# Patient Record
Sex: MALE | Race: WHITE | HISPANIC OR LATINO | Employment: UNEMPLOYED | URBAN - METROPOLITAN AREA
[De-identification: names, ages, dates, MRNs, and addresses within clinical notes are randomized per-mention and may not be internally consistent; named-entity substitution may affect disease eponyms.]

---

## 2017-01-11 ENCOUNTER — HOSPITAL ENCOUNTER (EMERGENCY)
Facility: HOSPITAL | Age: 22
Discharge: HOME | End: 2017-01-11
Attending: EMERGENCY MEDICINE | Admitting: EMERGENCY MEDICINE

## 2017-01-11 VITALS
HEART RATE: 74 BPM | DIASTOLIC BLOOD PRESSURE: 67 MMHG | RESPIRATION RATE: 20 BRPM | TEMPERATURE: 98.2 F | BODY MASS INDEX: 26.5 KG/M2 | WEIGHT: 190 LBS | SYSTOLIC BLOOD PRESSURE: 131 MMHG | OXYGEN SATURATION: 98 %

## 2017-01-11 DIAGNOSIS — M54.9 BACK PAIN: Primary | ICD-10-CM

## 2017-01-11 PROCEDURE — 99283 EMERGENCY DEPT VISIT LOW MDM: CPT

## 2017-01-11 RX ORDER — CYCLOBENZAPRINE HCL 10 MG
10 TABLET ORAL 2 TIMES DAILY PRN
Qty: 14 TABLET | Refills: 0 | Status: SHIPPED | OUTPATIENT
Start: 2017-01-11 | End: 2018-02-11

## 2017-01-11 RX ORDER — IBUPROFEN 400 MG/1
800 TABLET ORAL ONCE
Status: COMPLETED | OUTPATIENT
Start: 2017-01-11 | End: 2017-01-11

## 2017-01-11 RX ORDER — CYCLOBENZAPRINE HCL 10 MG
10 TABLET ORAL ONCE
Status: COMPLETED | OUTPATIENT
Start: 2017-01-11 | End: 2017-01-11

## 2017-01-11 RX ADMIN — CYCLOBENZAPRINE HYDROCHLORIDE 10 MG: 10 TABLET, FILM COATED ORAL at 23:06

## 2017-01-11 RX ADMIN — IBUPROFEN 800 MG: 400 TABLET ORAL at 23:05

## 2017-02-21 ENCOUNTER — APPOINTMENT (EMERGENCY)
Dept: RADIOLOGY | Facility: HOSPITAL | Age: 22
End: 2017-02-21
Payer: SUBSIDIZED

## 2017-02-21 ENCOUNTER — HOSPITAL ENCOUNTER (EMERGENCY)
Facility: HOSPITAL | Age: 22
Discharge: HOME | End: 2017-02-21
Attending: EMERGENCY MEDICINE | Admitting: EMERGENCY MEDICINE
Payer: SUBSIDIZED

## 2017-02-21 VITALS
WEIGHT: 195 LBS | TEMPERATURE: 97.5 F | SYSTOLIC BLOOD PRESSURE: 114 MMHG | DIASTOLIC BLOOD PRESSURE: 56 MMHG | BODY MASS INDEX: 27.3 KG/M2 | RESPIRATION RATE: 20 BRPM | OXYGEN SATURATION: 98 % | HEART RATE: 67 BPM | HEIGHT: 71 IN

## 2017-02-21 DIAGNOSIS — S83.90XA KNEE SPRAIN: Primary | ICD-10-CM

## 2017-02-21 PROCEDURE — 99283 EMERGENCY DEPT VISIT LOW MDM: CPT

## 2017-02-21 PROCEDURE — 73564 X-RAY EXAM KNEE 4 OR MORE: CPT

## 2017-02-21 RX ORDER — IBUPROFEN 600 MG/1
600 TABLET ORAL ONCE
Status: COMPLETED | OUTPATIENT
Start: 2017-02-21 | End: 2017-02-21

## 2017-02-21 RX ORDER — NAPROXEN 500 MG/1
500 TABLET ORAL 2 TIMES DAILY WITH MEALS
Qty: 10 TABLET | Refills: 0 | Status: SHIPPED | OUTPATIENT
Start: 2017-02-21 | End: 2018-02-11

## 2017-02-21 RX ORDER — HYDROCODONE BITARTRATE AND ACETAMINOPHEN 5; 325 MG/1; MG/1
1 TABLET ORAL EVERY 8 HOURS PRN
Qty: 10 TABLET | Refills: 0 | Status: SHIPPED | OUTPATIENT
Start: 2017-02-21 | End: 2017-02-24

## 2017-02-21 RX ADMIN — IBUPROFEN 600 MG: 600 TABLET, FILM COATED ORAL at 15:30

## 2018-02-11 ENCOUNTER — HOSPITAL ENCOUNTER (EMERGENCY)
Facility: HOSPITAL | Age: 23
Discharge: HOME | End: 2018-02-11
Attending: EMERGENCY MEDICINE | Admitting: EMERGENCY MEDICINE
Payer: COMMERCIAL

## 2018-02-11 ENCOUNTER — APPOINTMENT (EMERGENCY)
Dept: RADIOLOGY | Facility: HOSPITAL | Age: 23
End: 2018-02-11
Payer: COMMERCIAL

## 2018-02-11 VITALS
WEIGHT: 190 LBS | SYSTOLIC BLOOD PRESSURE: 140 MMHG | BODY MASS INDEX: 26.5 KG/M2 | RESPIRATION RATE: 16 BRPM | DIASTOLIC BLOOD PRESSURE: 66 MMHG | TEMPERATURE: 97.6 F | OXYGEN SATURATION: 98 % | HEART RATE: 89 BPM

## 2018-02-11 DIAGNOSIS — S83.90XA KNEE SPRAIN: Primary | ICD-10-CM

## 2018-02-11 PROCEDURE — 99283 EMERGENCY DEPT VISIT LOW MDM: CPT

## 2018-02-11 PROCEDURE — 73564 X-RAY EXAM KNEE 4 OR MORE: CPT

## 2018-02-11 RX ORDER — IBUPROFEN 600 MG/1
600 TABLET ORAL ONCE
Status: COMPLETED | OUTPATIENT
Start: 2018-02-11 | End: 2018-02-11

## 2018-02-11 RX ADMIN — IBUPROFEN 600 MG: 600 TABLET, FILM COATED ORAL at 20:17

## 2018-02-12 NOTE — DISCHARGE INSTRUCTIONS
Knee Sprain   WHAT YOU NEED TO KNOW:   A knee sprain occurs when one or more ligaments in your knee are suddenly stretched or torn  Ligaments are tissues that hold bones together  Ligaments support the knee and keep the joint and bones in the correct position  DISCHARGE INSTRUCTIONS:   Seek care immediately if:   · Any part of your leg feels cold, numb, or looks pale     Contact your healthcare provider if:   · You have new or increased swelling, bruising, or pain in your knee  · Your symptoms do not improve within 6 weeks, even with treatment  · You have questions or concerns about your condition or care  Medicines:   · NSAIDs , such as ibuprofen, help decrease swelling, pain, and fever  This medicine is available with or without a doctor's order  NSAIDs can cause stomach bleeding or kidney problems in certain people  If you take blood thinner medicine, always ask your healthcare provider if NSAIDs are safe for you  Always read the medicine label and follow directions  · Acetaminophen  decreases pain and fever  It is available without a doctor's order  Ask how much to take and how often to take it  Follow directions  Read the labels of all other medicines you are using to see if they also contain acetaminophen, or ask your doctor or pharmacist  Acetaminophen can cause liver damage if not taken correctly  Do not use more than 4 grams (4,000 milligrams) total of acetaminophen in one day  · Prescription pain medicine  may be given  Ask how to take this medicine safely  · Take your medicine as directed  Contact your healthcare provider if you think your medicine is not helping or if you have side effects  Tell him or her if you are allergic to any medicine  Keep a list of the medicines, vitamins, and herbs you take  Include the amounts, and when and why you take them  Bring the list or the pill bottles to follow-up visits  Carry your medicine list with you in case of an emergency    Self-care: · Rest  your knee and do not exercise  You may be told to keep weight off your knee  This means that you should not walk on your injured leg  Rest helps decrease swelling and allows the injury to heal  You can do gentle range of motion (ROM) exercises as directed  This will prevent stiffness  · Apply ice  on your knee for 15 to 20 minutes every hour or as directed  Use an ice pack, or put crushed ice in a plastic bag  Cover it with a towel  Ice helps prevent tissue damage and decreases swelling and pain  · Apply compression to your knee as directed  You may need to wear an elastic bandage  This helps keep your injured knee from moving too much while it heals  You can loosen or tighten the elastic bandage to make it comfortable  It should be tight enough for you to feel support  It should not be so tight that it causes your toes to feel numb or tingly  If you are wearing an elastic bandage, take it off and rewrap it once a day  · Elevate your knee  above the level of your heart as often as you can  This will help decrease swelling and pain  Prop your leg on pillows or blankets to keep it elevated comfortably  Do not put pillows directly behind your knee  · Use support devices as directed:  Support devices such as a splint or brace may be needed  These devices limit movement and protect your joint while it heals  You may be given crutches to use until you can stand on your injured leg without pain  Use devices as directed  Physical therapy:  A physical therapist teaches you exercises to help improve movement and strength, and to decrease pain  Prevent another knee sprain:  Exercise your legs to keep your muscles strong  Strong leg muscles help protect your knee and prevent strain  The following may also prevent a knee sprain:  · Slowly start your exercise or training program   Slowly increase the time, distance, and intensity of your exercise   Sudden increases in training may cause you to injure your knee again  · Wear protective braces and equipment as directed  Braces may prevent your knee from moving the wrong way and causing another sprain  Protective equipment may support your bones and ligaments to prevent injury  · Warm up and stretch before exercise  Warm up by walking or using an exercise bike before starting your regular exercise  Do gentle stretches after warming up  This helps to loosen your muscles and decrease stress on your knee  Cool down and stretch after you exercise  · Wear shoes that fit correctly and support your feet  Replace your running or exercise shoes before the padding or shock absorption is worn out  Ask your healthcare provider which exercise shoes are best for you  Ask if you should wear special shoe inserts  Shoe inserts can help support your heels and arches or keep your foot lined up correctly in your shoes  Exercise on flat surfaces  Follow up with your healthcare provider as directed:  Write down your questions so you remember to ask them during your visits  © 2017 [address] [employer] Information is for End User's use only and may not be sold, redistributed or otherwise used for commercial purposes  All illustrations and images included in CareNotes® are the copyrighted property of [employer]  or Tim Almazan  The above information is an  only  It is not intended as medical advice for individual conditions or treatments  Talk to your doctor, nurse or pharmacist before following any medical regimen to see if it is safe and effective for you

## 2018-02-12 NOTE — ED PROVIDER NOTES
History  Chief Complaint   Patient presents with    Knee Pain     R knee injury three weeks ago at work, today slipped and injured same knee     Patient presents for evaluation of right knee pain  Has injured that knee before and feels he tweaked it playing football  History provided by:  Patient   used: No    Knee Pain       None       Past Medical History:   Diagnosis Date    Back problem        History reviewed  No pertinent surgical history  History reviewed  No pertinent family history  I have reviewed and agree with the history as documented  Social History   Substance Use Topics    Smoking status: Never Smoker    Smokeless tobacco: Never Used    Alcohol use Yes      Comment: socially        Review of Systems   Respiratory: Apnea: s  All other systems reviewed and are negative  Physical Exam  ED Triage Vitals [02/11/18 1950]   Temperature Pulse Respirations Blood Pressure SpO2   97 6 °F (36 4 °C) 89 16 140/66 98 %      Temp Source Heart Rate Source Patient Position - Orthostatic VS BP Location FiO2 (%)   Tympanic Monitor Sitting Right arm --      Pain Score       8           Orthostatic Vital Signs  Vitals:    02/11/18 1950   BP: 140/66   Pulse: 89   Patient Position - Orthostatic VS: Sitting       Physical Exam   Constitutional: He is oriented to person, place, and time  No distress  Cardiovascular: Normal rate, regular rhythm and intact distal pulses  Pulmonary/Chest: Effort normal and breath sounds normal  No respiratory distress  Musculoskeletal: Normal range of motion  He exhibits tenderness  Legs:  Neurological: He is alert and oriented to person, place, and time  Skin: Capillary refill takes less than 2 seconds  He is not diaphoretic  Nursing note and vitals reviewed        ED Medications  Medications   ibuprofen (MOTRIN) tablet 600 mg (600 mg Oral Given 2/11/18 2017)       Diagnostic Studies  Results Reviewed     None                 XR knee 4+ vw right injury    (Results Pending)              Procedures  Procedures       Phone Contacts  ED Phone Contact    ED Course  ED Course                                MDM  Number of Diagnoses or Management Options  Knee sprain:   Diagnosis management comments: Pulse ox 98%  on RA indicating adequate oxygenation  Xray R knee: no fx or dislocation as read by me       Amount and/or Complexity of Data Reviewed  Tests in the radiology section of CPT®: ordered and reviewed  Decide to obtain previous medical records or to obtain history from someone other than the patient: yes  Review and summarize past medical records: yes  Independent visualization of images, tracings, or specimens: yes    Patient Progress  Patient progress: stable    CritCare Time    Disposition  Final diagnoses:   Knee sprain     Time reflects when diagnosis was documented in both MDM as applicable and the Disposition within this note     Time User Action Codes Description Comment    2/11/2018  8:34 PM Jimmy, [address] Knee sprain       ED Disposition     ED Disposition Condition Comment    Discharge  Sergio Kan discharge to home/self care  Condition at discharge: stable      Follow-up Information     Follow up With Specialties Details Why 600 Joanne Mcintosh MD Orthopedic Surgery In 1 week  [address]  [phone]          There are no discharge medications for this patient  No discharge procedures on file      ED Provider  Electronically Signed by           Mikayla Tim DO  02/11/18 4217

## 2018-05-30 ENCOUNTER — HOSPITAL ENCOUNTER (EMERGENCY)
Facility: HOSPITAL | Age: 23
Discharge: HOME | End: 2018-05-30
Attending: EMERGENCY MEDICINE | Admitting: EMERGENCY MEDICINE
Payer: SUBSIDIZED

## 2018-05-30 ENCOUNTER — APPOINTMENT (EMERGENCY)
Dept: RADIOLOGY | Facility: HOSPITAL | Age: 23
End: 2018-05-30
Payer: SUBSIDIZED

## 2018-05-30 VITALS
TEMPERATURE: 98.1 F | WEIGHT: 195 LBS | DIASTOLIC BLOOD PRESSURE: 87 MMHG | OXYGEN SATURATION: 97 % | BODY MASS INDEX: 27.2 KG/M2 | SYSTOLIC BLOOD PRESSURE: 127 MMHG | HEART RATE: 89 BPM | RESPIRATION RATE: 20 BRPM

## 2018-05-30 DIAGNOSIS — S43.101A SEPARATION OF RIGHT ACROMIOCLAVICULAR JOINT, INITIAL ENCOUNTER: Primary | ICD-10-CM

## 2018-05-30 PROCEDURE — 73050 X-RAY EXAM OF SHOULDERS: CPT

## 2018-05-30 PROCEDURE — 96372 THER/PROPH/DIAG INJ SC/IM: CPT

## 2018-05-30 PROCEDURE — 99283 EMERGENCY DEPT VISIT LOW MDM: CPT

## 2018-05-30 RX ORDER — HYDROCODONE BITARTRATE AND ACETAMINOPHEN 5; 325 MG/1; MG/1
1 TABLET ORAL EVERY 6 HOURS PRN
Qty: 20 TABLET | Refills: 0 | Status: SHIPPED | OUTPATIENT
Start: 2018-05-30 | End: 2018-06-06

## 2018-05-30 RX ORDER — HYDROCODONE BITARTRATE AND ACETAMINOPHEN 5; 325 MG/1; MG/1
2 TABLET ORAL ONCE
Status: COMPLETED | OUTPATIENT
Start: 2018-05-30 | End: 2018-05-30

## 2018-05-30 RX ADMIN — HYDROMORPHONE HYDROCHLORIDE 1 MG: 1 INJECTION, SOLUTION INTRAMUSCULAR; INTRAVENOUS; SUBCUTANEOUS at 21:30

## 2018-05-30 RX ADMIN — HYDROCODONE BITARTRATE AND ACETAMINOPHEN 2 TABLET: 5; 325 TABLET ORAL at 22:35

## 2018-05-31 NOTE — ED PROVIDER NOTES
History  Chief Complaint   Patient presents with    Shoulder Injury     states was playing basketball and ran into a wall injuring his R shoulder     Patient was playing basketball tonight and drove his right dominant shoulder into a wall at speed  Patient injured the [facility] joint area of the right shoulder  He has a visible deformity of the distal clavicle  No other injury  The patient keeps his arm in neutral position, states it hurts to move in any direction  None       Past Medical History:   Diagnosis Date    Back problem        History reviewed  No pertinent surgical history  History reviewed  No pertinent family history  I have reviewed and agree with the history as documented  Social History   Substance Use Topics    Smoking status: Never Smoker    Smokeless tobacco: Never Used    Alcohol use Yes      Comment: socially        Review of Systems   Constitutional: Negative for chills and fever  Respiratory: Negative for shortness of breath  Cardiovascular: Negative for chest pain  Gastrointestinal: Negative for abdominal pain  Musculoskeletal: Positive for arthralgias, joint swelling and myalgias  Negative for back pain, neck pain and neck stiffness  Neurological: Positive for weakness  Negative for numbness  All other systems reviewed and are negative  Physical Exam  Physical Exam   Constitutional: He is oriented to person, place, and time  He appears well-developed and well-nourished  HENT:   Head: Normocephalic and atraumatic  Eyes: Conjunctivae are normal    Neck: Normal range of motion  Neck supple  Cardiovascular: Normal rate, regular rhythm and normal heart sounds  Pulmonary/Chest: Effort normal    Abdominal: Soft  There is no tenderness  Musculoskeletal: He exhibits tenderness and deformity  The [facility] joint appears disrupted possibly grade 3  Patient's hand is neurovascularly intact, is able to supinate and flex the elbow     Neurological: He is alert and oriented to person, place, and time  Skin: Skin is warm and dry  Localized abrasion at the area of the injury  No bleeding   Psychiatric: He has a normal mood and affect  His behavior is normal    Nursing note and vitals reviewed  Vital Signs  ED Triage Vitals [05/30/18 2050]   Temperature Pulse Respirations Blood Pressure SpO2   98 1 °F (36 7 °C) 89 20 127/87 97 %      Temp Source Heart Rate Source Patient Position - Orthostatic VS BP Location FiO2 (%)   Tympanic Monitor Sitting Left arm --      Pain Score       Worst Possible Pain           Vitals:    05/30/18 2050   BP: 127/87   Pulse: 89   Patient Position - Orthostatic VS: Sitting       Visual Acuity      ED Medications  Medications   HYDROmorphone (DILAUDID) injection 1 mg (1 mg Intramuscular Given 5/30/18 2130)       Diagnostic Studies  Results Reviewed     None                 XR acromioclavicular joints bilateral w wo weights    (Results Pending)              Procedures  Procedures       Phone Contacts  ED Phone Contact    ED Course                               MDM  Number of Diagnoses or Management Options  Diagnosis management comments: Will obtain bilateral AC joint use for comparisons  CritCare Time    Disposition  Final diagnoses:   Separation of right acromioclavicular joint, initial encounter     Time reflects when diagnosis was documented in both MDM as applicable and the Disposition within this note     Time User Action Codes Description Comment    5/30/2018  9:53 PM Claire Borja Add [S43 101A] Separation of right acromioclavicular joint, initial encounter       ED Disposition     ED Disposition Condition Comment    Discharge  Samantha Albrecht discharge to home/self care      Condition at discharge: Stable        Follow-up Information     Follow up With Specialties Details Why 600 Joanne Mcintosh MD Orthopedic Surgery Schedule an appointment as soon as possible for a visit in 1 day  [address] [facility] [address]  [phone]            Patient's Medications   Discharge Prescriptions    HYDROCODONE-ACETAMINOPHEN (NORCO) 5-325 MG PER TABLET    Take 1 tablet by mouth every 6 (six) hours as needed for pain for up to 7 days Max Daily Amount: 4 tablets       Start Date: 5/30/2018 End Date: 6/6/2018       Order Dose: 1 tablet       Quantity: 20 tablet    Refills: 0     No discharge procedures on file      ED Provider  Electronically Signed by           Telma Langley MD  05/30/18 7936

## 2018-05-31 NOTE — DISCHARGE INSTRUCTIONS
Acromioclavicular Separation   WHAT YOU NEED TO KNOW:   An acromioclavicular separation (AS), or shoulder separation, is when your shoulder and collarbone move or come apart  An AS is usually caused by an injury, such as falling on your shoulder  The bones move or come apart because the ligaments that hold the bones in place are stretched or torn  DISCHARGE INSTRUCTIONS:   Medicines: You may need any of the following:  · Acetaminophen  decreases pain and is available without a doctor's order  Ask how much to take and how often to take it  Follow directions  Acetaminophen can cause liver damage if not taken correctly  · NSAIDs  help decrease swelling and pain  This medicine is available with or without a doctor's order  NSAIDs can cause stomach bleeding or kidney problems in certain people  If you take blood thinner medicine, always ask your healthcare provider if NSAIDs are safe for you  Always read the medicine label and follow directions  · A Tetanus (Td) vaccine  may be needed if you have an open wound  This vaccine is a booster shot used to help prevent diphtheria and tetanus  · Take your medicine as directed  Contact your healthcare provider if you think your medicine is not helping or if you have side effects  Tell him if you are allergic to any medicine  Keep a list of the medicines, vitamins, and herbs you take  Include the amounts, and when and why you take them  Bring the list or the pill bottles to follow-up visits  Carry your medicine list with you in case of an emergency  Apply ice:  Apply ice on your shoulder for 15 to 20 minutes every hour for the first 1 to 2 days  Use an ice pack, or put crushed ice in a plastic bag  Cover it with a towel  Ice helps prevent tissue damage and decreases swelling and pain  Apply heat:  Apply heat on your shoulder for 20 to 30 minutes every 2 hours after the first 1 to 2 days  Heat helps decrease pain and muscle spasms  Wear your support device:   You may need to wear a strap, elastic bandage, or sling  These devices keep your shoulder in the correct position so it can heal   · Wear the strap or sling constantly for 6 to 8 weeks, even when you sleep  You may remove the strap or sling when you bathe  Do not move your shoulder or arm when the strap or sling is off  Do not lift your arm  · The strap or sling must be tightened by another person every day  Tighten it enough to keep your shoulders back in the correct posture  Tell the person to allow enough room to fit an index finger between your body and the strap  Put a folded wash cloth in your armpit to prevent pressure on the nerves by the strap  Loosen the strap if you feel numbness or tingling in your arm or hand  Rest your shoulder:  Rest your shoulder as much as possible to decrease swelling and help it heal    Follow up with your healthcare provider as directed:  Write down your questions so you remember to ask them during your visits  Contact your healthcare provider if:   · You have a fever  · You have worse pain, even after you take medicine  · You have an open wound that is red, swollen, or draining pus  · Your arm or hand becomes numb or tingles  · You have questions or concerns about your condition or care  Return to the emergency department if:   · You lose feeling in your arm or hand  · You cannot move your arm or hand  © 2017 [address] [employer] Information is for End User's use only and may not be sold, redistributed or otherwise used for commercial purposes  All illustrations and images included in CareNotes® are the copyrighted property of [employer]  or Tim [employer]mazan  The above information is an  only  It is not intended as medical advice for individual conditions or treatments  Talk to your doctor, nurse or pharmacist before following any medical regimen to see if it is safe and effective for you

## 2019-01-04 ENCOUNTER — HOSPITAL ENCOUNTER (EMERGENCY)
Facility: HOSPITAL | Age: 24
Discharge: HOME | End: 2019-01-04
Attending: EMERGENCY MEDICINE | Admitting: EMERGENCY MEDICINE
Payer: COMMERCIAL

## 2019-01-04 VITALS
TEMPERATURE: 98.8 F | OXYGEN SATURATION: 96 % | SYSTOLIC BLOOD PRESSURE: 135 MMHG | WEIGHT: 201 LBS | BODY MASS INDEX: 28.03 KG/M2 | DIASTOLIC BLOOD PRESSURE: 56 MMHG | HEART RATE: 70 BPM | RESPIRATION RATE: 16 BRPM

## 2019-01-04 DIAGNOSIS — J02.9 PHARYNGITIS: Primary | ICD-10-CM

## 2019-01-04 PROCEDURE — 99282 EMERGENCY DEPT VISIT SF MDM: CPT

## 2019-01-04 RX ORDER — DEXAMETHASONE 4 MG/1
6 TABLET ORAL ONCE
Status: COMPLETED | OUTPATIENT
Start: 2019-01-04 | End: 2019-01-04

## 2019-01-04 RX ADMIN — DEXAMETHASONE 6 MG: 4 TABLET ORAL at 11:39

## 2019-01-04 NOTE — ED PROVIDER NOTES
History  Chief Complaint   Patient presents with    Sore Throat     c/o having a sore throat off and on for 2 months     21year old male presents with sore throat x 2 months  He has been having a sore throat with painful swallowing on and off for the last 2 months  He is a current smoker and is around a lot of people who are smokers  He was evaluated in urgent care yesterday, had a negative rapid strep but came in for further evaluation as he was not given anything at the urgent care  He has tried cepacol, honey and lemon tea, and various other OTC medications  He states he has tried antibiotics a few weeks ago which helped a little and this without 1 way however it comes again  He denies any fever, chills, difficulty breathing, shortness of breath, chest pain, abdominal pain, nausea, vomiting, diarrhea, constipation, headache, weakness, ear pain, runny nose, cough  None       Past Medical History:   Diagnosis Date    Back problem        History reviewed  No pertinent surgical history  History reviewed  No pertinent family history  I have reviewed and agree with the history as documented  Social History   Substance Use Topics    Smoking status: Never Smoker    Smokeless tobacco: Never Used    Alcohol use Yes      Comment: socially        Review of Systems   Constitutional: Negative for chills and fever  HENT: Positive for sore throat  Negative for rhinorrhea, sinus pain, sinus pressure, sneezing and trouble swallowing  Respiratory: Negative for cough and shortness of breath  Cardiovascular: Negative for chest pain, palpitations and leg swelling  Gastrointestinal: Negative for abdominal pain, constipation, diarrhea, nausea and vomiting  Skin: Negative for color change, pallor, rash and wound  Neurological: Negative for dizziness, syncope, weakness, light-headedness, numbness and headaches  All other systems reviewed and are negative        Physical Exam  Physical Exam Constitutional: He appears well-developed and well-nourished  No distress  HENT:   Head: Normocephalic and atraumatic  Right Ear: Hearing, tympanic membrane, external ear and ear canal normal  Tympanic membrane is not perforated, not erythematous, not retracted and not bulging  Left Ear: Hearing, tympanic membrane, external ear and ear canal normal  Tympanic membrane is not perforated, not erythematous, not retracted and not bulging  Nose: Nose normal    Mouth/Throat: Uvula is midline, oropharynx is clear and moist and mucous membranes are normal  No trismus in the jaw  No uvula swelling  No oropharyngeal exudate, posterior oropharyngeal edema, posterior oropharyngeal erythema or tonsillar abscesses  No tonsillar exudate  Eyes: EOM are normal    Neck: Normal range of motion  Neck supple  Cardiovascular: Normal rate, regular rhythm, normal heart sounds and intact distal pulses  Exam reveals no gallop and no friction rub  No murmur heard  Pulmonary/Chest: Effort normal and breath sounds normal  No respiratory distress  He has no wheezes  He has no rales  Sp02 is 96% indicating adequate oxygenation on room air   Lymphadenopathy:     He has no cervical adenopathy  Skin: Skin is warm and dry  Capillary refill takes less than 2 seconds  No rash noted  He is not diaphoretic  No erythema  No pallor  Nursing note and vitals reviewed        Vital Signs  ED Triage Vitals [01/04/19 1104]   Temperature Pulse Respirations Blood Pressure SpO2   98 8 °F (37 1 °C) 70 16 135/56 96 %      Temp Source Heart Rate Source Patient Position - Orthostatic VS BP Location FiO2 (%)   Tympanic Monitor Sitting Right arm --      Pain Score       9           Vitals:    01/04/19 1104   BP: 135/56   Pulse: 70   Patient Position - Orthostatic VS: Sitting       Visual Acuity      ED Medications  Medications   dexamethasone (DECADRON) tablet 6 mg (6 mg Oral Given 1/4/19 1139)       Diagnostic Studies  Results Reviewed     None No orders to display              Procedures  Procedures       Phone Contacts  ED Phone Contact    ED Course                               MDM  Number of Diagnoses or Management Options  Pharyngitis:   Diagnosis management comments: Patient well appearing, afebrile, no neck pain or swelling, no difficulty breathing, negative rapid strep yesterday in urgent care  Given decadron in ED  Explained smoking cessation will be best for symptomatic treatment  Can take honey for sore throat  Given infolink number for patient to set up primary care physician for routine follow up  Gave patient proper education regarding diagnosis  Answered all questions  Return to ED for any worsening of symptoms otherwise follow up with primary care physician for re-evaluation  Discussed plan with patient who verbalized understanding and agreed to plan  Amount and/or Complexity of Data Reviewed  Review and summarize past medical records: yes  Discuss the patient with other providers: yes (Discussed case with Dr Penny White)      CritCare Time    Disposition  Final diagnoses:   Pharyngitis     Time reflects when diagnosis was documented in both MDM as applicable and the Disposition within this note     Time User Action Codes Description Comment    1/4/2019 11:27 AM Garland Left Add [J02 9] Pharyngitis       ED Disposition     ED Disposition Condition Comment    Discharge  Mancel Mule discharge to home/self care      Condition at discharge: Good        Follow-up Information     Follow up With Specialties Details Why Contact Info Additional Information    [employer]  Call today to set up primary care physician in your area for follow up and further work up [address] [facility] Emergency Department Emergency Medicine Go to As needed [address] [facility]  [phone] [facility], [address]          There are no discharge medications for this patient  No discharge procedures on file      ED Provider  Electronically Signed by           Femi Avalos PA-C  01/04/19 6121

## 2019-01-04 NOTE — DISCHARGE INSTRUCTIONS

## 2019-06-19 ENCOUNTER — HOSPITAL ENCOUNTER (EMERGENCY)
Facility: HOSPITAL | Age: 24
Discharge: HOME | End: 2019-06-19
Attending: EMERGENCY MEDICINE | Admitting: EMERGENCY MEDICINE
Payer: COMMERCIAL

## 2019-06-19 VITALS
WEIGHT: 195 LBS | HEIGHT: 71 IN | BODY MASS INDEX: 27.3 KG/M2 | DIASTOLIC BLOOD PRESSURE: 69 MMHG | RESPIRATION RATE: 18 BRPM | HEART RATE: 71 BPM | OXYGEN SATURATION: 100 % | TEMPERATURE: 98.3 F | SYSTOLIC BLOOD PRESSURE: 127 MMHG

## 2019-06-19 DIAGNOSIS — L03.90 CELLULITIS: ICD-10-CM

## 2019-06-19 DIAGNOSIS — L02.91 ABSCESS: Primary | ICD-10-CM

## 2019-06-19 PROCEDURE — 99282 EMERGENCY DEPT VISIT SF MDM: CPT

## 2019-06-19 PROCEDURE — 87070 CULTURE OTHR SPECIMN AEROBIC: CPT | Performed by: EMERGENCY MEDICINE

## 2019-06-19 PROCEDURE — 87205 SMEAR GRAM STAIN: CPT | Performed by: EMERGENCY MEDICINE

## 2019-06-19 PROCEDURE — 90471 IMMUNIZATION ADMIN: CPT

## 2019-06-19 PROCEDURE — 90715 TDAP VACCINE 7 YRS/> IM: CPT | Performed by: EMERGENCY MEDICINE

## 2019-06-19 PROCEDURE — 87186 SC STD MICRODIL/AGAR DIL: CPT | Performed by: EMERGENCY MEDICINE

## 2019-06-19 PROCEDURE — 87147 CULTURE TYPE IMMUNOLOGIC: CPT | Performed by: EMERGENCY MEDICINE

## 2019-06-19 RX ORDER — ACETAMINOPHEN 325 MG/1
650 TABLET ORAL ONCE
Status: COMPLETED | OUTPATIENT
Start: 2019-06-19 | End: 2019-06-19

## 2019-06-19 RX ORDER — LIDOCAINE HYDROCHLORIDE AND EPINEPHRINE 10; 10 MG/ML; UG/ML
20 INJECTION, SOLUTION INFILTRATION; PERINEURAL ONCE
Status: COMPLETED | OUTPATIENT
Start: 2019-06-19 | End: 2019-06-19

## 2019-06-19 RX ORDER — IBUPROFEN 600 MG/1
600 TABLET ORAL ONCE
Status: COMPLETED | OUTPATIENT
Start: 2019-06-19 | End: 2019-06-19

## 2019-06-19 RX ORDER — CLINDAMYCIN HYDROCHLORIDE 300 MG/1
300 CAPSULE ORAL EVERY 6 HOURS
Qty: 40 CAPSULE | Refills: 0 | Status: SHIPPED | OUTPATIENT
Start: 2019-06-19 | End: 2019-06-29

## 2019-06-19 RX ORDER — CLINDAMYCIN HYDROCHLORIDE 150 MG/1
300 CAPSULE ORAL ONCE
Status: COMPLETED | OUTPATIENT
Start: 2019-06-19 | End: 2019-06-19

## 2019-06-19 RX ORDER — DIAZEPAM 5 MG/1
5 TABLET ORAL ONCE
Status: COMPLETED | OUTPATIENT
Start: 2019-06-19 | End: 2019-06-19

## 2019-06-19 RX ORDER — TRAMADOL HYDROCHLORIDE 50 MG/1
50 TABLET ORAL EVERY 6 HOURS PRN
Qty: 6 TABLET | Refills: 0 | Status: SHIPPED | OUTPATIENT
Start: 2019-06-19 | End: 2019-06-22

## 2019-06-19 RX ADMIN — TETANUS TOXOID, REDUCED DIPHTHERIA TOXOID AND ACELLULAR PERTUSSIS VACCINE, ADSORBED 0.5 ML: 5; 2.5; 8; 8; 2.5 SUSPENSION INTRAMUSCULAR at 22:28

## 2019-06-19 RX ADMIN — ACETAMINOPHEN 650 MG: 325 TABLET, FILM COATED ORAL at 22:30

## 2019-06-19 RX ADMIN — CLINDAMYCIN HYDROCHLORIDE 300 MG: 150 CAPSULE ORAL at 23:48

## 2019-06-19 RX ADMIN — LIDOCAINE HYDROCHLORIDE,EPINEPHRINE BITARTRATE 20 ML: 10; .01 INJECTION, SOLUTION INFILTRATION; PERINEURAL at 22:31

## 2019-06-19 RX ADMIN — CLINDAMYCIN HYDROCHLORIDE 300 MG: 150 CAPSULE ORAL at 22:30

## 2019-06-19 RX ADMIN — DIAZEPAM 5 MG: 5 TABLET ORAL at 23:20

## 2019-06-19 RX ADMIN — IBUPROFEN 600 MG: 600 TABLET ORAL at 22:30

## 2019-06-22 LAB
BACTERIA WND AEROBE CULT: ABNORMAL
GRAM STN SPEC: ABNORMAL

## 2019-08-02 ENCOUNTER — APPOINTMENT (EMERGENCY)
Dept: RADIOLOGY | Facility: HOSPITAL | Age: 24
End: 2019-08-02

## 2019-08-02 ENCOUNTER — HOSPITAL ENCOUNTER (EMERGENCY)
Facility: HOSPITAL | Age: 24
Discharge: HOME | End: 2019-08-02
Attending: EMERGENCY MEDICINE | Admitting: EMERGENCY MEDICINE

## 2019-08-02 VITALS
RESPIRATION RATE: 16 BRPM | OXYGEN SATURATION: 96 % | HEIGHT: 71 IN | WEIGHT: 180 LBS | DIASTOLIC BLOOD PRESSURE: 59 MMHG | BODY MASS INDEX: 25.2 KG/M2 | HEART RATE: 70 BPM | TEMPERATURE: 97.4 F | SYSTOLIC BLOOD PRESSURE: 110 MMHG

## 2019-08-02 DIAGNOSIS — R11.10 VOMITING: ICD-10-CM

## 2019-08-02 DIAGNOSIS — F10.929 ALCOHOL INTOXICATION (HCC): Primary | ICD-10-CM

## 2019-08-02 LAB
ALBUMIN SERPL BCP-MCNC: 4.5 G/DL (ref 3.5–5)
ALP SERPL-CCNC: 87 U/L (ref 46–116)
ALT SERPL W P-5'-P-CCNC: 29 U/L (ref 12–78)
ANION GAP SERPL CALCULATED.3IONS-SCNC: 11 MMOL/L (ref 4–13)
AST SERPL W P-5'-P-CCNC: 15 U/L (ref 5–45)
BASOPHILS # BLD AUTO: 0.04 THOUSANDS/ΜL (ref 0–0.1)
BASOPHILS NFR BLD AUTO: 0 % (ref 0–1)
BILIRUB SERPL-MCNC: 0.3 MG/DL (ref 0.2–1)
BUN SERPL-MCNC: 13 MG/DL (ref 5–25)
CALCIUM SERPL-MCNC: 9.6 MG/DL (ref 8.3–10.1)
CHLORIDE SERPL-SCNC: 100 MMOL/L (ref 100–108)
CO2 SERPL-SCNC: 26 MMOL/L (ref 21–32)
CREAT SERPL-MCNC: 1.24 MG/DL (ref 0.6–1.3)
EOSINOPHIL # BLD AUTO: 0.2 THOUSAND/ΜL (ref 0–0.61)
EOSINOPHIL NFR BLD AUTO: 1 % (ref 0–6)
ERYTHROCYTE [DISTWIDTH] IN BLOOD BY AUTOMATED COUNT: 13.3 % (ref 11.6–15.1)
ETHANOL SERPL-MCNC: 83 MG/DL (ref 0–3)
GFR SERPL CREATININE-BSD FRML MDRD: 81 ML/MIN/1.73SQ M
GLUCOSE SERPL-MCNC: 87 MG/DL (ref 65–140)
HCT VFR BLD AUTO: 39.7 % (ref 36.5–49.3)
HGB BLD-MCNC: 13.7 G/DL (ref 12–17)
LYMPHOCYTES # BLD AUTO: 2.83 THOUSANDS/ΜL (ref 0.6–4.47)
LYMPHOCYTES NFR BLD AUTO: 18 % (ref 14–44)
MCH RBC QN AUTO: 30 PG (ref 26.8–34.3)
MCHC RBC AUTO-ENTMCNC: 34.5 G/DL (ref 31.4–37.4)
MCV RBC AUTO: 87 FL (ref 82–98)
MONOCYTES # BLD AUTO: 0.96 THOUSAND/ΜL (ref 0.17–1.22)
MONOCYTES NFR BLD AUTO: 6 % (ref 4–12)
NEUTROPHILS # BLD AUTO: 11.79 THOUSANDS/ΜL (ref 1.85–7.62)
NEUTS SEG NFR BLD AUTO: 75 % (ref 43–75)
PLATELET # BLD AUTO: 198 THOUSANDS/UL (ref 149–390)
PMV BLD AUTO: 11.2 FL (ref 8.9–12.7)
POTASSIUM SERPL-SCNC: 3.8 MMOL/L (ref 3.5–5.3)
PROT SERPL-MCNC: 8.1 G/DL (ref 6.4–8.2)
RBC # BLD AUTO: 4.57 MILLION/UL (ref 3.88–5.62)
SODIUM SERPL-SCNC: 137 MMOL/L (ref 136–145)
WBC # BLD AUTO: 15.82 THOUSAND/UL (ref 4.31–10.16)

## 2019-08-02 PROCEDURE — 80320 DRUG SCREEN QUANTALCOHOLS: CPT | Performed by: EMERGENCY MEDICINE

## 2019-08-02 PROCEDURE — 70450 CT HEAD/BRAIN W/O DYE: CPT

## 2019-08-02 PROCEDURE — 36415 COLL VENOUS BLD VENIPUNCTURE: CPT | Performed by: EMERGENCY MEDICINE

## 2019-08-02 PROCEDURE — 72125 CT NECK SPINE W/O DYE: CPT

## 2019-08-02 PROCEDURE — 80053 COMPREHEN METABOLIC PANEL: CPT | Performed by: EMERGENCY MEDICINE

## 2019-08-02 PROCEDURE — 96361 HYDRATE IV INFUSION ADD-ON: CPT

## 2019-08-02 PROCEDURE — 85025 COMPLETE CBC W/AUTO DIFF WBC: CPT | Performed by: EMERGENCY MEDICINE

## 2019-08-02 PROCEDURE — 99284 EMERGENCY DEPT VISIT MOD MDM: CPT

## 2019-08-02 PROCEDURE — 96374 THER/PROPH/DIAG INJ IV PUSH: CPT

## 2019-08-02 RX ORDER — ONDANSETRON 2 MG/ML
4 INJECTION INTRAMUSCULAR; INTRAVENOUS ONCE
Status: COMPLETED | OUTPATIENT
Start: 2019-08-02 | End: 2019-08-02

## 2019-08-02 RX ADMIN — SODIUM CHLORIDE 1000 ML: 0.9 INJECTION, SOLUTION INTRAVENOUS at 04:48

## 2019-08-02 RX ADMIN — ONDANSETRON 4 MG: 2 INJECTION INTRAMUSCULAR; INTRAVENOUS at 04:51

## 2019-08-02 NOTE — ED NOTES
Patient sleeping sounding  Respirations easy and unlabored  Repositions self independently  Awaiting patient's cousin, who as per report, will return around 18       Bernardo Hu RN  08/02/19 7250

## 2019-10-14 ENCOUNTER — APPOINTMENT (EMERGENCY)
Dept: RADIOLOGY | Facility: HOSPITAL | Age: 24
End: 2019-10-14
Attending: EMERGENCY MEDICINE

## 2019-10-14 ENCOUNTER — HOSPITAL ENCOUNTER (EMERGENCY)
Facility: HOSPITAL | Age: 24
Discharge: HOME | End: 2019-10-14
Attending: EMERGENCY MEDICINE

## 2019-10-14 VITALS
RESPIRATION RATE: 20 BRPM | HEART RATE: 57 BPM | TEMPERATURE: 97.9 F | DIASTOLIC BLOOD PRESSURE: 75 MMHG | OXYGEN SATURATION: 100 % | SYSTOLIC BLOOD PRESSURE: 129 MMHG

## 2019-10-14 DIAGNOSIS — R07.81 RIB PAIN ON LEFT SIDE: Primary | ICD-10-CM

## 2019-10-14 PROCEDURE — 99283 EMERGENCY DEPT VISIT LOW MDM: CPT

## 2019-10-14 PROCEDURE — 71046 X-RAY EXAM CHEST 2 VIEWS: CPT

## 2019-10-14 RX ORDER — NAPROXEN 500 MG/1
500 TABLET ORAL 2 TIMES DAILY WITH MEALS
Qty: 30 TABLET | Refills: 0 | Status: SHIPPED | OUTPATIENT
Start: 2019-10-14 | End: 2020-07-10 | Stop reason: HOSPADM

## 2019-10-14 RX ORDER — NAPROXEN 500 MG/1
500 TABLET ORAL ONCE
Status: COMPLETED | OUTPATIENT
Start: 2019-10-14 | End: 2019-10-14

## 2019-10-14 RX ORDER — LIDOCAINE 50 MG/G
1 PATCH TOPICAL ONCE
Status: DISCONTINUED | OUTPATIENT
Start: 2019-10-14 | End: 2019-10-14 | Stop reason: HOSPADM

## 2019-10-14 RX ADMIN — LIDOCAINE 1 PATCH: 50 PATCH TOPICAL at 01:15

## 2019-10-14 RX ADMIN — NAPROXEN 500 MG: 500 TABLET ORAL at 00:27

## 2019-10-14 NOTE — ED PROVIDER NOTES
History  Chief Complaint   Patient presents with    Rib Pain     pt states was playing baseball, swung bat and started running developed pain in left lower ribcage, pain increasing, worse with cough and movement  States was diagnosed with a lung infection a couple of months ago and never finished antibiotics       History provided by:  Patient   used: No      20-year-old male, currently being treated for latent tuberculosis with isoniazid presents emergency department for evaluation of left lower rib pain  Worsened with cough and movement  He was playing softball today  He was swinging when the pain started  He denies any current shortness of breath or abdominal pain  Denies any recent known trauma  Denies any fevers  None       Past Medical History:   Diagnosis Date    Back problem     Psychiatric disorder     TB lung, latent        History reviewed  No pertinent surgical history  History reviewed  No pertinent family history  I have reviewed and agree with the history as documented  Social History     Tobacco Use    Smoking status: Light Tobacco Smoker     Packs/day: 0 20    Smokeless tobacco: Never Used   Substance Use Topics    Alcohol use: Yes     Comment: socially    Drug use: Yes     Types: Marijuana        Review of Systems   Constitutional: Negative for activity change, appetite change, chills, fatigue and fever  HENT: Negative for congestion, dental problem, facial swelling, sore throat, tinnitus and trouble swallowing  Eyes: Negative for pain, discharge and itching  Respiratory: Negative for apnea, chest tightness and wheezing  Cardiovascular: Negative for chest pain, palpitations and leg swelling  Gastrointestinal: Negative for abdominal pain and nausea  Genitourinary: Negative for difficulty urinating, dysuria and flank pain  Musculoskeletal: Negative for arthralgias, back pain, gait problem, joint swelling and neck pain     Skin: Negative for color change, rash and wound  Neurological: Negative for dizziness and facial asymmetry  Psychiatric/Behavioral: Negative for agitation and behavioral problems  The patient is not nervous/anxious  All other systems reviewed and are negative  Physical Exam  Physical Exam   Constitutional: He is oriented to person, place, and time  He appears well-developed and well-nourished  No distress  HENT:   Head: Normocephalic and atraumatic  Right Ear: External ear normal    Left Ear: External ear normal    Eyes: Pupils are equal, round, and reactive to light  EOM are normal  Right eye exhibits no discharge  Left eye exhibits no discharge  Neck: Normal range of motion  Neck supple  No tracheal deviation present  No thyromegaly present  Cardiovascular: Normal rate and regular rhythm  No murmur heard  Pulmonary/Chest: Effort normal and breath sounds normal    Equal bilateral breath sounds, no respiratory distress, no tachypnea   Abdominal: Soft  Bowel sounds are normal  He exhibits no distension  There is no tenderness  Musculoskeletal: Normal range of motion  He exhibits no edema or deformity  Neurological: He is alert and oriented to person, place, and time  No cranial nerve deficit  He exhibits normal muscle tone  Skin: Skin is warm  Capillary refill takes less than 2 seconds  He is not diaphoretic  Psychiatric: He has a normal mood and affect  His behavior is normal    Nursing note and vitals reviewed        Vital Signs  ED Triage Vitals [10/14/19 0015]   Temperature Pulse Respirations Blood Pressure SpO2   97 9 °F (36 6 °C) 57 20 129/75 100 %      Temp Source Heart Rate Source Patient Position - Orthostatic VS BP Location FiO2 (%)   Tympanic -- Lying Right arm --      Pain Score       5           Vitals:    10/14/19 0015   BP: 129/75   Pulse: 57   Patient Position - Orthostatic VS: Lying         Visual Acuity      ED Medications  Medications   lidocaine (LIDODERM) 5 % patch 1 patch (1 patch Topical Medication Applied 10/14/19 0115)   naproxen (NAPROSYN) tablet 500 mg (500 mg Oral Given 10/14/19 0027)       Diagnostic Studies  Results Reviewed     None                 XR chest 2 views    (Results Pending)              Procedures  Procedures       ED Course                               MDM  Number of Diagnoses or Management Options  Rib pain on left side: new and requires workup  Diagnosis management comments: Left-sided chest wall pain started with swinging a softball bat today  Chest x-ray with no signs of pneumothorax, no signs of pneumonia  Being treated for latent TB, intermittent compliance with his medications  Strongly recommended taking isoniazid as recommended by his outpatient physicians  No laboratory studies imaging, no tenderness over the spleen, no recent trauma that would require additional labs or imaging at this time  Vital signs normal   Naproxen, Lidoderm patch given to patient and he was discharged home to follow up with his primary care physician  Amount and/or Complexity of Data Reviewed  Tests in the radiology section of CPT®: ordered and reviewed  Review and summarize past medical records: yes    Risk of Complications, Morbidity, and/or Mortality  Presenting problems: moderate  Diagnostic procedures: moderate  Management options: moderate    Patient Progress  Patient progress: improved      Disposition  Final diagnoses:   Rib pain on left side     Time reflects when diagnosis was documented in both MDM as applicable and the Disposition within this note     Time User Action Codes Description Comment    10/14/2019  1:08 AM Dearl Dry Add [R07 81] Rib pain on left side       ED Disposition     ED Disposition Condition Date/Time Comment    Discharge Stable Mon Oct 14, 2019  1:07 AM Melum 50 discharge to home/self care              Follow-up Information     Follow up With Specialties Details Why Contact Info Additional Information    Brittney Andrade MD Family Medicine Go in 3 days If symptoms worsen, As needed Will [phone] [facility] Emergency Department Emergency Medicine Go to  If symptoms worsen [address] [facility] ED, [place], [place], [address]          Discharge Medication List as of 10/14/2019  1:09 AM      START taking these medications    Details   naproxen (NAPROSYN) 500 mg tablet Take 1 tablet (500 mg total) by mouth 2 (two) times a day with meals, Starting Mon 10/14/2019, Print           No discharge procedures on file      ED Provider  Electronically Signed by           Elisa Ye MD  10/14/19 7158

## 2019-10-14 NOTE — DISCHARGE INSTRUCTIONS
Please purchase over-the-counter Lidoderm patches and apply to affected area  Please take naproxen as prescribed  Please see your primary care physician for long-term management of chronic medical conditions  Please take your isoniazid for latent TB

## 2019-10-14 NOTE — ED NOTES
Pt requesting that we call his girlfriend Megan Trammell at [phone] and ask her what medicine he is supposed to be taking   Dr Annie Rosario spoke with girlfriend     Jaida Bautista RN  10/14/19 3269

## 2020-01-19 ENCOUNTER — HOSPITAL ENCOUNTER (EMERGENCY)
Facility: HOSPITAL | Age: 25
Discharge: HOME | End: 2020-01-19
Attending: EMERGENCY MEDICINE | Admitting: EMERGENCY MEDICINE

## 2020-01-19 VITALS
RESPIRATION RATE: 16 BRPM | HEART RATE: 67 BPM | OXYGEN SATURATION: 100 % | WEIGHT: 190 LBS | SYSTOLIC BLOOD PRESSURE: 117 MMHG | TEMPERATURE: 98.5 F | DIASTOLIC BLOOD PRESSURE: 63 MMHG | BODY MASS INDEX: 26.5 KG/M2

## 2020-01-19 DIAGNOSIS — F10.929 ALCOHOL INTOXICATION (HCC): Primary | ICD-10-CM

## 2020-01-19 LAB
ALBUMIN SERPL BCP-MCNC: 4.9 G/DL (ref 3.5–5)
ALP SERPL-CCNC: 94 U/L (ref 46–116)
ALT SERPL W P-5'-P-CCNC: 35 U/L (ref 12–78)
ANION GAP SERPL CALCULATED.3IONS-SCNC: 11 MMOL/L (ref 4–13)
AST SERPL W P-5'-P-CCNC: 24 U/L (ref 5–45)
BASOPHILS # BLD AUTO: 0.03 THOUSANDS/ΜL (ref 0–0.1)
BASOPHILS NFR BLD AUTO: 0 % (ref 0–1)
BILIRUB SERPL-MCNC: 0.6 MG/DL (ref 0.2–1)
BUN SERPL-MCNC: 10 MG/DL (ref 5–25)
CALCIUM SERPL-MCNC: 9.4 MG/DL (ref 8.3–10.1)
CHLORIDE SERPL-SCNC: 103 MMOL/L (ref 100–108)
CO2 SERPL-SCNC: 28 MMOL/L (ref 21–32)
CREAT SERPL-MCNC: 0.97 MG/DL (ref 0.6–1.3)
EOSINOPHIL # BLD AUTO: 0.06 THOUSAND/ΜL (ref 0–0.61)
EOSINOPHIL NFR BLD AUTO: 1 % (ref 0–6)
ERYTHROCYTE [DISTWIDTH] IN BLOOD BY AUTOMATED COUNT: 12.1 % (ref 11.6–15.1)
ETHANOL SERPL-MCNC: 74 MG/DL (ref 0–3)
GFR SERPL CREATININE-BSD FRML MDRD: 109 ML/MIN/1.73SQ M
GLUCOSE SERPL-MCNC: 125 MG/DL (ref 65–140)
HCT VFR BLD AUTO: 42.1 % (ref 36.5–49.3)
HGB BLD-MCNC: 14.1 G/DL (ref 12–17)
HOLD SPECIMEN: NORMAL
IMM GRANULOCYTES # BLD AUTO: 0.08 THOUSAND/UL (ref 0–0.2)
IMM GRANULOCYTES NFR BLD AUTO: 1 % (ref 0–2)
LIPASE SERPL-CCNC: 117 U/L (ref 73–393)
LYMPHOCYTES # BLD AUTO: 1.72 THOUSANDS/ΜL (ref 0.6–4.47)
LYMPHOCYTES NFR BLD AUTO: 14 % (ref 14–44)
MCH RBC QN AUTO: 30 PG (ref 26.8–34.3)
MCHC RBC AUTO-ENTMCNC: 33.5 G/DL (ref 31.4–37.4)
MCV RBC AUTO: 90 FL (ref 82–98)
MONOCYTES # BLD AUTO: 0.55 THOUSAND/ΜL (ref 0.17–1.22)
MONOCYTES NFR BLD AUTO: 5 % (ref 4–12)
NEUTROPHILS # BLD AUTO: 9.87 THOUSANDS/ΜL (ref 1.85–7.62)
NEUTS SEG NFR BLD AUTO: 79 % (ref 43–75)
NRBC BLD AUTO-RTO: 0 /100 WBCS
PLATELET # BLD AUTO: 215 THOUSANDS/UL (ref 149–390)
PMV BLD AUTO: 11 FL (ref 8.9–12.7)
POTASSIUM SERPL-SCNC: 4.4 MMOL/L (ref 3.5–5.3)
PROT SERPL-MCNC: 8.8 G/DL (ref 6.4–8.2)
RBC # BLD AUTO: 4.7 MILLION/UL (ref 3.88–5.62)
SODIUM SERPL-SCNC: 142 MMOL/L (ref 136–145)
WBC # BLD AUTO: 12.31 THOUSAND/UL (ref 4.31–10.16)

## 2020-01-19 PROCEDURE — 83690 ASSAY OF LIPASE: CPT | Performed by: EMERGENCY MEDICINE

## 2020-01-19 PROCEDURE — 96361 HYDRATE IV INFUSION ADD-ON: CPT

## 2020-01-19 PROCEDURE — 85025 COMPLETE CBC W/AUTO DIFF WBC: CPT | Performed by: EMERGENCY MEDICINE

## 2020-01-19 PROCEDURE — 80320 DRUG SCREEN QUANTALCOHOLS: CPT | Performed by: EMERGENCY MEDICINE

## 2020-01-19 PROCEDURE — 96374 THER/PROPH/DIAG INJ IV PUSH: CPT

## 2020-01-19 PROCEDURE — 99283 EMERGENCY DEPT VISIT LOW MDM: CPT | Performed by: EMERGENCY MEDICINE

## 2020-01-19 PROCEDURE — 99284 EMERGENCY DEPT VISIT MOD MDM: CPT

## 2020-01-19 PROCEDURE — 36415 COLL VENOUS BLD VENIPUNCTURE: CPT | Performed by: EMERGENCY MEDICINE

## 2020-01-19 PROCEDURE — 80053 COMPREHEN METABOLIC PANEL: CPT | Performed by: EMERGENCY MEDICINE

## 2020-01-19 RX ORDER — ONDANSETRON 2 MG/ML
4 INJECTION INTRAMUSCULAR; INTRAVENOUS ONCE
Status: COMPLETED | OUTPATIENT
Start: 2020-01-19 | End: 2020-01-19

## 2020-01-19 RX ADMIN — ONDANSETRON 4 MG: 2 INJECTION INTRAMUSCULAR; INTRAVENOUS at 06:21

## 2020-01-19 RX ADMIN — SODIUM CHLORIDE 1000 ML: 0.9 INJECTION, SOLUTION INTRAVENOUS at 06:20

## 2020-01-19 NOTE — ED NOTES
Pt sts he has nowhere to go  IV d/c'd  Breakfast ordered  Pt agreeable to leave after breakfast   Trying to get a ride        Reid Hamm, KARL  01/19/20 4965

## 2020-01-19 NOTE — ED NOTES
Ate breakfast   Danyelle called for pt  He is going to his cousins house       Ange Henriquez RN  01/19/20 3929
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
## 2020-07-09 ENCOUNTER — HOSPITAL ENCOUNTER (EMERGENCY)
Facility: HOSPITAL | Age: 25
End: 2020-07-09
Attending: EMERGENCY MEDICINE | Admitting: EMERGENCY MEDICINE
Payer: COMMERCIAL

## 2020-07-09 ENCOUNTER — HOSPITAL ENCOUNTER (INPATIENT)
Facility: HOSPITAL | Age: 25
LOS: 1 days | Discharge: LEFT BEFORE DISCHARGE | DRG: 201 | End: 2020-07-09
Attending: ANESTHESIOLOGY | Admitting: INTERNAL MEDICINE
Payer: COMMERCIAL

## 2020-07-09 ENCOUNTER — ANESTHESIA (INPATIENT)
Dept: NON INVASIVE DIAGNOSTICS | Facility: HOSPITAL | Age: 25
DRG: 201 | End: 2020-07-09
Payer: COMMERCIAL

## 2020-07-09 ENCOUNTER — APPOINTMENT (INPATIENT)
Dept: RADIOLOGY | Facility: HOSPITAL | Age: 25
DRG: 201 | End: 2020-07-09
Payer: COMMERCIAL

## 2020-07-09 VITALS
OXYGEN SATURATION: 99 % | WEIGHT: 190.7 LBS | BODY MASS INDEX: 26.7 KG/M2 | RESPIRATION RATE: 16 BRPM | DIASTOLIC BLOOD PRESSURE: 56 MMHG | TEMPERATURE: 98.7 F | HEART RATE: 32 BPM | HEIGHT: 71 IN | SYSTOLIC BLOOD PRESSURE: 118 MMHG

## 2020-07-09 VITALS
HEART RATE: 44 BPM | RESPIRATION RATE: 16 BRPM | OXYGEN SATURATION: 99 % | SYSTOLIC BLOOD PRESSURE: 108 MMHG | DIASTOLIC BLOOD PRESSURE: 52 MMHG | TEMPERATURE: 98.6 F

## 2020-07-09 DIAGNOSIS — I44.1 SECOND DEGREE HEART BLOCK: Primary | ICD-10-CM

## 2020-07-09 DIAGNOSIS — I44.1 MOBITZ TYPE 2 SECOND DEGREE HEART BLOCK: Primary | ICD-10-CM

## 2020-07-09 DIAGNOSIS — R00.1 BRADYCARDIA: ICD-10-CM

## 2020-07-09 PROBLEM — Z86.15 HISTORY OF LATENT TUBERCULOSIS: Status: ACTIVE | Noted: 2020-07-09

## 2020-07-09 LAB
ALBUMIN SERPL BCP-MCNC: 3.7 G/DL (ref 3.5–5)
ALP SERPL-CCNC: 102 U/L (ref 46–116)
ALT SERPL W P-5'-P-CCNC: 17 U/L (ref 12–78)
ANION GAP SERPL CALCULATED.3IONS-SCNC: 11 MMOL/L (ref 4–13)
AST SERPL W P-5'-P-CCNC: 10 U/L (ref 5–45)
ATRIAL RATE: 33 BPM
ATRIAL RATE: 41 BPM
ATRIAL RATE: 45 BPM
ATRIAL RATE: 48 BPM
ATRIAL RATE: 94 BPM
ATRIAL RATE: 98 BPM
BASOPHILS # BLD AUTO: 0.04 THOUSANDS/ΜL (ref 0–0.1)
BASOPHILS NFR BLD AUTO: 0 % (ref 0–1)
BILIRUB SERPL-MCNC: 0.22 MG/DL (ref 0.2–1)
BUN SERPL-MCNC: 14 MG/DL (ref 5–25)
CALCIUM SERPL-MCNC: 9.1 MG/DL (ref 8.3–10.1)
CHLORIDE SERPL-SCNC: 106 MMOL/L (ref 100–108)
CO2 SERPL-SCNC: 23 MMOL/L (ref 21–32)
CREAT SERPL-MCNC: 1.04 MG/DL (ref 0.6–1.3)
EOSINOPHIL # BLD AUTO: 0.19 THOUSAND/ΜL (ref 0–0.61)
EOSINOPHIL NFR BLD AUTO: 2 % (ref 0–6)
ERYTHROCYTE [DISTWIDTH] IN BLOOD BY AUTOMATED COUNT: 12.8 % (ref 11.6–15.1)
GFR SERPL CREATININE-BSD FRML MDRD: 100 ML/MIN/1.73SQ M
GLUCOSE SERPL-MCNC: 125 MG/DL (ref 65–140)
HCT VFR BLD AUTO: 41 % (ref 36.5–49.3)
HGB BLD-MCNC: 13.6 G/DL (ref 12–17)
IMM GRANULOCYTES # BLD AUTO: 0.05 THOUSAND/UL (ref 0–0.2)
IMM GRANULOCYTES NFR BLD AUTO: 0 % (ref 0–2)
LYMPHOCYTES # BLD AUTO: 2.8 THOUSANDS/ΜL (ref 0.6–4.47)
LYMPHOCYTES NFR BLD AUTO: 22 % (ref 14–44)
MAGNESIUM SERPL-MCNC: 2.1 MG/DL (ref 1.6–2.6)
MCH RBC QN AUTO: 30.2 PG (ref 26.8–34.3)
MCHC RBC AUTO-ENTMCNC: 33.2 G/DL (ref 31.4–37.4)
MCV RBC AUTO: 91 FL (ref 82–98)
MONOCYTES # BLD AUTO: 1.27 THOUSAND/ΜL (ref 0.17–1.22)
MONOCYTES NFR BLD AUTO: 10 % (ref 4–12)
NEUTROPHILS # BLD AUTO: 8.55 THOUSANDS/ΜL (ref 1.85–7.62)
NEUTS SEG NFR BLD AUTO: 66 % (ref 43–75)
NRBC BLD AUTO-RTO: 0 /100 WBCS
P AXIS: 62 DEGREES
P AXIS: 66 DEGREES
P AXIS: 73 DEGREES
P AXIS: 73 DEGREES
P AXIS: 76 DEGREES
P AXIS: 78 DEGREES
PHOSPHATE SERPL-MCNC: 3.1 MG/DL (ref 2.7–4.5)
PLATELET # BLD AUTO: 277 THOUSANDS/UL (ref 149–390)
PMV BLD AUTO: 10.1 FL (ref 8.9–12.7)
POTASSIUM SERPL-SCNC: 3.9 MMOL/L (ref 3.5–5.3)
PR INTERVAL: 344 MS
PR INTERVAL: 344 MS
PR INTERVAL: 352 MS
PR INTERVAL: 398 MS
PR INTERVAL: 398 MS
PROT SERPL-MCNC: 7.7 G/DL (ref 6.4–8.2)
QRS AXIS: 78 DEGREES
QRS AXIS: 78 DEGREES
QRS AXIS: 86 DEGREES
QRS AXIS: 88 DEGREES
QRS AXIS: 89 DEGREES
QRS AXIS: 90 DEGREES
QRSD INTERVAL: 104 MS
QRSD INTERVAL: 90 MS
QRSD INTERVAL: 92 MS
QRSD INTERVAL: 98 MS
QT INTERVAL: 468 MS
QT INTERVAL: 474 MS
QT INTERVAL: 476 MS
QT INTERVAL: 478 MS
QT INTERVAL: 482 MS
QT INTERVAL: 628 MS
QTC INTERVAL: 347 MS
QTC INTERVAL: 353 MS
QTC INTERVAL: 386 MS
QTC INTERVAL: 395 MS
QTC INTERVAL: 416 MS
QTC INTERVAL: 451 MS
RBC # BLD AUTO: 4.51 MILLION/UL (ref 3.88–5.62)
SARS-COV-2 RNA RESP QL NAA+PROBE: NEGATIVE
SODIUM SERPL-SCNC: 140 MMOL/L (ref 136–145)
T WAVE AXIS: 24 DEGREES
T WAVE AXIS: 40 DEGREES
T WAVE AXIS: 41 DEGREES
T WAVE AXIS: 58 DEGREES
T WAVE AXIS: 61 DEGREES
T WAVE AXIS: 79 DEGREES
TROPONIN I SERPL-MCNC: 0.03 NG/ML
TROPONIN I SERPL-MCNC: <0.02 NG/ML
TSH SERPL DL<=0.05 MIU/L-ACNC: 1.61 UIU/ML (ref 0.36–3.74)
VENTRICULAR RATE: 31 BPM
VENTRICULAR RATE: 32 BPM
VENTRICULAR RATE: 33 BPM
VENTRICULAR RATE: 41 BPM
VENTRICULAR RATE: 42 BPM
VENTRICULAR RATE: 45 BPM
WBC # BLD AUTO: 12.9 THOUSAND/UL (ref 4.31–10.16)

## 2020-07-09 PROCEDURE — 87635 SARS-COV-2 COVID-19 AMP PRB: CPT | Performed by: EMERGENCY MEDICINE

## 2020-07-09 PROCEDURE — 84484 ASSAY OF TROPONIN QUANT: CPT | Performed by: INTERNAL MEDICINE

## 2020-07-09 PROCEDURE — 87040 BLOOD CULTURE FOR BACTERIA: CPT | Performed by: EMERGENCY MEDICINE

## 2020-07-09 PROCEDURE — 87476 LYME DIS DNA AMP PROBE: CPT | Performed by: EMERGENCY MEDICINE

## 2020-07-09 PROCEDURE — 93005 ELECTROCARDIOGRAM TRACING: CPT

## 2020-07-09 PROCEDURE — C1898 LEAD, PMKR, OTHER THAN TRANS: HCPCS

## 2020-07-09 PROCEDURE — 99285 EMERGENCY DEPT VISIT HI MDM: CPT

## 2020-07-09 PROCEDURE — 93010 ELECTROCARDIOGRAM REPORT: CPT | Performed by: INTERNAL MEDICINE

## 2020-07-09 PROCEDURE — 87476 LYME DIS DNA AMP PROBE: CPT | Performed by: PHYSICIAN ASSISTANT

## 2020-07-09 PROCEDURE — 5A1223Z PERFORMANCE OF CARDIAC PACING, CONTINUOUS: ICD-10-PCS | Performed by: INTERNAL MEDICINE

## 2020-07-09 PROCEDURE — 84443 ASSAY THYROID STIM HORMONE: CPT | Performed by: INTERNAL MEDICINE

## 2020-07-09 PROCEDURE — 84100 ASSAY OF PHOSPHORUS: CPT | Performed by: EMERGENCY MEDICINE

## 2020-07-09 PROCEDURE — 33210 INSERT ELECTRD/PM CATH SNGL: CPT

## 2020-07-09 PROCEDURE — C1892 INTRO/SHEATH,FIXED,PEEL-AWAY: HCPCS

## 2020-07-09 PROCEDURE — 99239 HOSP IP/OBS DSCHRG MGMT >30: CPT | Performed by: INTERNAL MEDICINE

## 2020-07-09 PROCEDURE — 99255 IP/OBS CONSLTJ NEW/EST HI 80: CPT | Performed by: INTERNAL MEDICINE

## 2020-07-09 PROCEDURE — NC001 PR NO CHARGE: Performed by: INTERNAL MEDICINE

## 2020-07-09 PROCEDURE — 99291 CRITICAL CARE FIRST HOUR: CPT | Performed by: EMERGENCY MEDICINE

## 2020-07-09 PROCEDURE — 99223 1ST HOSP IP/OBS HIGH 75: CPT | Performed by: INTERNAL MEDICINE

## 2020-07-09 PROCEDURE — 87389 HIV-1 AG W/HIV-1&-2 AB AG IA: CPT | Performed by: INTERNAL MEDICINE

## 2020-07-09 PROCEDURE — 85025 COMPLETE CBC W/AUTO DIFF WBC: CPT | Performed by: EMERGENCY MEDICINE

## 2020-07-09 PROCEDURE — 36415 COLL VENOUS BLD VENIPUNCTURE: CPT | Performed by: EMERGENCY MEDICINE

## 2020-07-09 PROCEDURE — 80053 COMPREHEN METABOLIC PANEL: CPT | Performed by: EMERGENCY MEDICINE

## 2020-07-09 PROCEDURE — 83735 ASSAY OF MAGNESIUM: CPT | Performed by: EMERGENCY MEDICINE

## 2020-07-09 PROCEDURE — 86618 LYME DISEASE ANTIBODY: CPT | Performed by: PHYSICIAN ASSISTANT

## 2020-07-09 PROCEDURE — 33210 INSERT ELECTRD/PM CATH SNGL: CPT | Performed by: INTERNAL MEDICINE

## 2020-07-09 PROCEDURE — 71045 X-RAY EXAM CHEST 1 VIEW: CPT

## 2020-07-09 PROCEDURE — 76937 US GUIDE VASCULAR ACCESS: CPT

## 2020-07-09 PROCEDURE — 86617 LYME DISEASE ANTIBODY: CPT | Performed by: PHYSICIAN ASSISTANT

## 2020-07-09 RX ORDER — FENTANYL CITRATE 50 UG/ML
INJECTION, SOLUTION INTRAMUSCULAR; INTRAVENOUS AS NEEDED
Status: DISCONTINUED | OUTPATIENT
Start: 2020-07-09 | End: 2020-07-09 | Stop reason: SURG

## 2020-07-09 RX ORDER — MIDAZOLAM HYDROCHLORIDE 2 MG/2ML
INJECTION, SOLUTION INTRAMUSCULAR; INTRAVENOUS AS NEEDED
Status: DISCONTINUED | OUTPATIENT
Start: 2020-07-09 | End: 2020-07-09 | Stop reason: SURG

## 2020-07-09 RX ORDER — NICOTINE 21 MG/24HR
1 PATCH, TRANSDERMAL 24 HOURS TRANSDERMAL DAILY
Status: DISCONTINUED | OUTPATIENT
Start: 2020-07-09 | End: 2020-07-10 | Stop reason: HOSPADM

## 2020-07-09 RX ORDER — SODIUM CHLORIDE 9 MG/ML
100 INJECTION, SOLUTION INTRAVENOUS CONTINUOUS
Status: DISCONTINUED | OUTPATIENT
Start: 2020-07-09 | End: 2020-07-10 | Stop reason: HOSPADM

## 2020-07-09 RX ORDER — SODIUM CHLORIDE 9 MG/ML
INJECTION, SOLUTION INTRAVENOUS CONTINUOUS PRN
Status: DISCONTINUED | OUTPATIENT
Start: 2020-07-09 | End: 2020-07-09 | Stop reason: SURG

## 2020-07-09 RX ORDER — CEFAZOLIN SODIUM 2 G/50ML
SOLUTION INTRAVENOUS AS NEEDED
Status: DISCONTINUED | OUTPATIENT
Start: 2020-07-09 | End: 2020-07-09 | Stop reason: SURG

## 2020-07-09 RX ORDER — ACETAMINOPHEN 325 MG/1
650 TABLET ORAL EVERY 6 HOURS PRN
Status: DISCONTINUED | OUTPATIENT
Start: 2020-07-09 | End: 2020-07-10 | Stop reason: HOSPADM

## 2020-07-09 RX ORDER — LIDOCAINE HYDROCHLORIDE 10 MG/ML
INJECTION, SOLUTION EPIDURAL; INFILTRATION; INTRACAUDAL; PERINEURAL CODE/TRAUMA/SEDATION MEDICATION
Status: COMPLETED | OUTPATIENT
Start: 2020-07-09 | End: 2020-07-09

## 2020-07-09 RX ORDER — DOXYCYCLINE 100 MG/1
100 TABLET ORAL 2 TIMES DAILY
Qty: 42 TABLET | Refills: 0 | Status: SHIPPED | OUTPATIENT
Start: 2020-07-09 | End: 2020-07-30

## 2020-07-09 RX ORDER — MAGNESIUM HYDROXIDE/ALUMINUM HYDROXICE/SIMETHICONE 120; 1200; 1200 MG/30ML; MG/30ML; MG/30ML
30 SUSPENSION ORAL EVERY 6 HOURS PRN
Status: DISCONTINUED | OUTPATIENT
Start: 2020-07-09 | End: 2020-07-10 | Stop reason: HOSPADM

## 2020-07-09 RX ORDER — ACETAMINOPHEN 325 MG/1
975 TABLET ORAL ONCE
Status: DISCONTINUED | OUTPATIENT
Start: 2020-07-09 | End: 2020-07-09 | Stop reason: HOSPADM

## 2020-07-09 RX ORDER — DOCUSATE SODIUM 100 MG/1
100 CAPSULE, LIQUID FILLED ORAL 2 TIMES DAILY
Status: DISCONTINUED | OUTPATIENT
Start: 2020-07-09 | End: 2020-07-10 | Stop reason: HOSPADM

## 2020-07-09 RX ORDER — PROPOFOL 10 MG/ML
INJECTION, EMULSION INTRAVENOUS CONTINUOUS PRN
Status: DISCONTINUED | OUTPATIENT
Start: 2020-07-09 | End: 2020-07-09 | Stop reason: SURG

## 2020-07-09 RX ORDER — DOXYCYCLINE HYCLATE 100 MG/1
100 CAPSULE ORAL ONCE
Status: COMPLETED | OUTPATIENT
Start: 2020-07-09 | End: 2020-07-09

## 2020-07-09 RX ORDER — ONDANSETRON 2 MG/ML
4 INJECTION INTRAMUSCULAR; INTRAVENOUS EVERY 6 HOURS PRN
Status: DISCONTINUED | OUTPATIENT
Start: 2020-07-09 | End: 2020-07-10 | Stop reason: HOSPADM

## 2020-07-09 RX ADMIN — DOXYCYCLINE 100 MG: 100 CAPSULE ORAL at 21:23

## 2020-07-09 RX ADMIN — SODIUM CHLORIDE: 0.9 INJECTION, SOLUTION INTRAVENOUS at 15:31

## 2020-07-09 RX ADMIN — LIDOCAINE HYDROCHLORIDE 7 ML: 10 INJECTION, SOLUTION EPIDURAL; INFILTRATION; INTRACAUDAL; PERINEURAL at 16:00

## 2020-07-09 RX ADMIN — FENTANYL CITRATE 50 MCG: 50 INJECTION, SOLUTION INTRAMUSCULAR; INTRAVENOUS at 16:00

## 2020-07-09 RX ADMIN — MIDAZOLAM 1 MG: 1 INJECTION INTRAMUSCULAR; INTRAVENOUS at 15:35

## 2020-07-09 RX ADMIN — FENTANYL CITRATE 25 MCG: 50 INJECTION, SOLUTION INTRAMUSCULAR; INTRAVENOUS at 15:56

## 2020-07-09 RX ADMIN — PROPOFOL 100 MCG/KG/MIN: 10 INJECTION, EMULSION INTRAVENOUS at 15:55

## 2020-07-09 RX ADMIN — MIDAZOLAM 1 MG: 1 INJECTION INTRAMUSCULAR; INTRAVENOUS at 15:55

## 2020-07-09 RX ADMIN — FENTANYL CITRATE 25 MCG: 50 INJECTION, SOLUTION INTRAMUSCULAR; INTRAVENOUS at 15:38

## 2020-07-09 RX ADMIN — CEFAZOLIN SODIUM 2000 MG: 2 SOLUTION INTRAVENOUS at 15:40

## 2020-07-09 RX ADMIN — ACETAMINOPHEN 650 MG: 325 TABLET, FILM COATED ORAL at 21:39

## 2020-07-09 RX ADMIN — SODIUM CHLORIDE 100 ML/HR: 0.9 INJECTION, SOLUTION INTRAVENOUS at 12:39

## 2020-07-09 NOTE — CONSULTS
Consultation - Electrophysiology - Cardiology  Arlette Horn 25 y o  male MRN: [id number]  Unit/Bed#: [facility] 401-01 Encounter: [id number]            Inpatient consult to Cardiology     Performed by  Selene Salazar DO     Authorized by Rylan Walter MD              History of Present Illness   Physician Requesting Consult: Rylan Walter MD  Reason for Consult / Principal Problem: bradycardia    Assessment/Plan   Assessment:  1  High degree AV block    Plan:  1  Temporary Permanent pacemaker placement  F/u ECHO, utox, TSH, lyme screen  If lyme screen is negative patient will need work up for other causes of the heart block  HPI: Arlette Horn is a 25y o  year old male with no past medical history presented from [facility] with complete heart block  Patient he has feeling of chest pounding for the past 2 weeks with worsening in the last 2 days, associated with lightheadedness, dyspnea  Admits to subjective fever, right elbow pain, headache  Patient admits to camping in [place] for about 10 days ago  He also admits to extensive [place]  Denies rashes, neurologic deficits  Admits to daily marihuana use, 1-2 beers per week  Denies IV drug use  Smokes 1-2 cigarettes per day fro  3 days  Pt states that he doesn't know much of his family Hx  Primary Cardiologist: none    Cardiac testing: ECHO pending    TELE:  Paroxysmal AV block with heart rate in high 20s low 30s    EKG:          Review of Systems  ROS as noted above, otherwise 12 point review of systems was performed and is negative  Historical Information   Past Medical History:   Diagnosis Date    ADHD     Anxiety     Back problem     Bipolar 1 disorder ([facility] [address] )     Depression     Psychiatric disorder     TB lung, latent      No past surgical history on file    Social History     Substance and Sexual Activity   Alcohol Use Yes    Frequency: 2-3 times a week    Drinks per session: 3 or 4    Comment: socially     Social History     Substance and Sexual Activity   Drug Use Yes    Types: Marijuana     Social History     Tobacco Use   Smoking Status Light Tobacco Smoker    Packs/day: 0 20   Smokeless Tobacco Never Used     Family History: non-contributory    Meds/Allergies   Hospital Medications:   Current Facility-Administered Medications   Medication Dose Route Frequency    acetaminophen (TYLENOL) tablet 650 mg  650 mg Oral Q6H PRN    aluminum-magnesium hydroxide-simethicone (MYLANTA) 200-200-20 mg/5 mL oral suspension 30 mL  30 mL Oral Q6H PRN    docusate sodium (COLACE) capsule 100 mg  100 mg Oral BID    nicotine (NICODERM CQ) 14 mg/24hr TD 24 hr patch 1 patch  1 patch Transdermal Daily    ondansetron (ZOFRAN) injection 4 mg  4 mg Intravenous Q6H PRN    sodium chloride 0 9 % infusion  100 mL/hr Intravenous Continuous     Home Medications:   Medications Prior to Admission   Medication    naproxen (NAPROSYN) 500 mg tablet       No Known Allergies    Objective   Vitals: Blood pressure 115/57, pulse (!) 30, temperature 98 4 °F (36 9 °C), temperature source Oral, resp  rate 16, SpO2 100 %    Orthostatic Blood Pressures      Most Recent Value   Blood Pressure  115/57 filed at 07/09/2020 1130   Patient Position - Orthostatic VS  Lying filed at 07/09/2020 1130          No intake or output data in the 24 hours ending 07/09/20 1458    Invasive Devices     Peripheral Intravenous Line            Peripheral IV 07/09/20 Left Antecubital less than 1 day    Peripheral IV 07/09/20 Right Antecubital less than 1 day                Physical Exam   GEN: NAD, alert and oriented, well appearing  SKIN: dry without significant lesions or rashes  HEENT: NCAT, PERRL, EOMs intact  NECK: No JVD or carotid bruits appreciated  CARDIOVASCULAR: bradycardia, normal S1, S2 without murmurs, rubs, or gallops appreciated  LUNGS: Clear to auscultation bilaterally without wheezes, rhonchi, or rales  ABDOMEN: Soft, nontender, nondistended  EXTREMITIES/VASCULAR: perfused without clubbing, cyanosis, or edema b/l   NEURO: CN ll-Xll grossly intact    Lab Results: I have personally reviewed pertinent lab results  Results from last 7 days   Lab Units 07/09/20  0835   WBC Thousand/uL 12 90*   HEMOGLOBIN g/dL 13 6   HEMATOCRIT % 41 0   PLATELETS Thousands/uL 277     Results from last 7 days   Lab Units 07/09/20  0835   POTASSIUM mmol/L 3 9   CHLORIDE mmol/L 106   CO2 mmol/L 23   BUN mg/dL 14   CREATININE mg/dL 1 04   CALCIUM mg/dL 9 1         Results from last 7 days   Lab Units 07/09/20  0835   MAGNESIUM mg/dL 2 1       Imaging: I have personally reviewed pertinent reports         Cardiac testing:   ECHO:   pending    CATH:  none    STRESS TEST:  none    VTE Prophylaxis:SCDs

## 2020-07-09 NOTE — ANESTHESIA PREPROCEDURE EVALUATION
Review of Systems/Medical History  Patient summary reviewed  Chart reviewed  No history of anesthetic complications     Cardiovascular  EKG reviewed, Exercise tolerance (METS): <4,  Dysrhythmias , 2nd degree block,    Pulmonary  Smoker cigarette smoker  ,        GI/Hepatic  Negative GI/hepatic ROS          Negative  ROS        Endo/Other  Negative endo/other ROS      GYN       Hematology  Negative hematology ROS      Musculoskeletal  Negative musculoskeletal ROS        Neurology  Negative neurology ROS      Psychology   Psychiatric history (ADHD), Anxiety, Depression ,     Comment: ETOH abuse         Physical Exam    Airway    Mallampati score: I  TM Distance: >3 FB  Neck ROM: full     Dental   No notable dental hx     Cardiovascular  Rhythm: regular, Rate: abnormal,     Pulmonary  Pulmonary exam normal Breath sounds clear to auscultation,     Other Findings        Anesthesia Plan  ASA Score- 2 Emergent    Anesthesia Type- IV sedation with anesthesia with ASA Monitors  Additional Monitors:   Airway Plan:         Plan Factors-    Induction-     Postoperative Plan-     Informed Consent- Anesthetic plan and risks discussed with patient  I personally reviewed this patient with the CRNA  Discussed and agreed on the Anesthesia Plan with the BALJINDER Madden

## 2020-07-09 NOTE — PROCEDURES
TEMPORARY -PERMANENT PACEMAKER     History and physical were reviewed  Patient was examined and history was reviewed  No change in patient's condition Since history and physical has been completed        The pre- operative diagnosis:  Complete heart block -Ventricular escape at 30  Patient's symptomatic with intermittent dizziness, pre-syncope    High suspicion for Lyme's disease - awaiting blood test           Postoperative diagnosis:  Complete heart block -Ventricular escape at 30  Patient's symptomatic with intermittent dizziness, pre-syncope    High suspicion for Lyme's disease - awaiting blood test           Procedure:  1  Temporary permanent pacemaker placement   Will be in body for >48 hrs             Surgeon: [address] -none    Specimens - none    Estimated blood loss- 5 ml    Findings-none    Complications none    Anesthesia-  local lidocaine by myself      Details of the device  VVI pacing  Pacer rate- 40/m  Threshold -1 mV   Pacing voltage 5 mv                    Description of procedure: The patient was seen before the procedure  The details of the procedure was explained and patient agreed to the same  Appropriate consent was signed  The patient was brought to the electrophysiologic laboratory  Proper time out was done  Sterile dressing and draping was done    Local lidocaine was infiltrated over the right internal jugular region  The internal jugular was identified under ultrasound guidance  Access was obtained using the modified Seldinger technique  Initial access with micro-puncture needle  Thereafter changed to standard wire and 7F sheath       The RV lead was taken all the way to the right ventricular apex     Position was confirmed in both MARTINEZ and French views to confirm it was apically and septally placed   Pacing wire was checked for appropriate sensing, pacing and thresholds   The lead was screwed into the septum  The sheath was removed  The lead slack was adjusted   The lead was sutured to the skin with 3 separate sutures      Took care of lead with sterile dressing  The external un sterile  pacemaker was connected   The device was interrogated and programmed to pacing at 5 times the threshold    Appropriate dressing was done to have the temporary -permanent lead and pacemaker safely positioned         Summary of the procedure: The patient came in to the laboratory for temporary - permanent  pacer placement   The device has been placed and patient tolerated the procedure well

## 2020-07-09 NOTE — ED PROVIDER NOTES
History  Chief Complaint   Patient presents with    Chest Pain     c/o chest pain/tightness, SOB, palpitations, on/off dizziness, non-productive cough x 3 days  Pt had a fever 1 week ago    Headache     c/o left sided headache x 3 weeks  Pt admitted his vision is better when he wears glasses     25 y o  Male presents with chief complaint of palpitations  He states his heart doesn't seem to be beating correctly  He reports he has felt this way for about a week  He states he noticed it last week when he was trying to play softball and didn't have the energy and couldn't catch his breath  He reports he had a fever and cough about one week ago  Patient is also complaining of a headache  History provided by:  Patient   used: No    Palpitations   Palpitations quality:  Slow  Onset quality:  Gradual  Duration:  1 week  Timing:  Constant  Progression:  Unchanged  Chronicity:  New  Relieved by:  Nothing  Worsened by:  Nothing  Ineffective treatments:  None tried  Associated symptoms: chest pain and shortness of breath    Associated symptoms: no diaphoresis, no nausea and no vomiting        Prior to Admission Medications   Prescriptions Last Dose Informant Patient Reported? Taking?   naproxen (NAPROSYN) 500 mg tablet Not Taking at Unknown time  No No   Sig: Take 1 tablet (500 mg total) by mouth 2 (two) times a day with meals   Patient not taking: Reported on 7/9/2020      Facility-Administered Medications: None       Past Medical History:   Diagnosis Date    ADHD     Anxiety     Back problem     Bipolar 1 disorder ([facility] [address] )     Depression     Psychiatric disorder     TB lung, latent        History reviewed  No pertinent surgical history  History reviewed  No pertinent family history  I have reviewed and agree with the history as documented      E-Cigarette/Vaping     E-Cigarette/Vaping Substances     Social History     Tobacco Use    Smoking status: Light Tobacco Smoker     Packs/day: 0 20    Smokeless tobacco: Never Used   Substance Use Topics    Alcohol use: Yes     Frequency: 2-3 times a week     Drinks per session: 3 or 4     Comment: socially    Drug use: Yes     Types: Marijuana       Review of Systems   Constitutional: Positive for fatigue and fever (resovled)  Negative for chills and diaphoresis  Respiratory: Positive for shortness of breath  Cardiovascular: Positive for chest pain and palpitations  Gastrointestinal: Negative for diarrhea, nausea and vomiting  Genitourinary: Negative for dysuria and frequency  Skin: Negative for rash  Neurological: Positive for headaches  All other systems reviewed and are negative  Physical Exam  Physical Exam   Constitutional: He is oriented to person, place, and time  He appears well-developed and well-nourished  He appears distressed (mild)  HENT:   Head: Normocephalic and atraumatic  Eyes: Pupils are equal, round, and reactive to light  EOM are normal    Neck: Normal range of motion  No JVD present  Cardiovascular: Regular rhythm, normal heart sounds and intact distal pulses  Bradycardia present  Exam reveals no gallop and no friction rub  No murmur heard  Pulmonary/Chest: Effort normal and breath sounds normal  No respiratory distress  He has no wheezes  He has no rales  He exhibits no tenderness  Musculoskeletal: Normal range of motion  He exhibits no tenderness  Neurological: He is alert and oriented to person, place, and time  Skin: Skin is warm and dry  Psychiatric: He has a normal mood and affect  His behavior is normal  Judgment and thought content normal    Nursing note and vitals reviewed        Vital Signs  ED Triage Vitals [07/09/20 0815]   Temperature Pulse Respirations Blood Pressure SpO2   98 7 °F (37 1 °C) (!) 31 16 118/56 100 %      Temp Source Heart Rate Source Patient Position - Orthostatic VS BP Location FiO2 (%)   Oral Monitor Lying Right arm --      Pain Score       No Pain           Vitals: 07/09/20 0815 07/09/20 0930   BP: 118/56    Pulse: (!) 31 (!) 32   Patient Position - Orthostatic VS: Lying          Visual Acuity      ED Medications  Medications - No data to display    Diagnostic Studies  Results Reviewed     Procedure Component Value Units Date/Time    Blood culture #1 [[id number]] Collected:  07/09/20 7641    Lab Status:  Preliminary result Specimen:  Blood from Arm, Left Updated:  07/09/20 1501     Blood Culture Received in Microbiology Lab  Culture in Progress  Blood culture #2 [[id number]] Collected:  07/09/20 4405    Lab Status:  Preliminary result Specimen:  Blood from Arm, Right Updated:  07/09/20 1501     Blood Culture Received in Microbiology Lab  Culture in Progress  Novel Coronavirus Morgan [facility]DONADO [facility] [[id number]]  (Normal) Collected:  07/09/20 0857    Lab Status:  Final result Specimen:  Nares from Nose Updated:  07/09/20 0956     SARS-CoV-2 Negative    Narrative: The specimen collection materials, transport medium, and/or testing methodology utilized in the production of these test results have been proven to be reliable in a limited validation with an abbreviated program under the Emergency Utilization Authorization provided by the FDA  Testing reported as "Presumptive positive" will be confirmed with secondary testing with a reference laboratory to ensure result accuracy  Clinical caution and judgement should be used with the interpretation of these results with consideration of the clinical impression and other laboratory testing  Testing reported as "Positive" or "Negative" has been proven to be accurate according to standard laboratory validation requirements  All testing is performed with control materials showing appropriate reactivity at standard intervals        Comprehensive metabolic panel [[id number]] Collected:  07/09/20 0835    Lab Status:  Final result Specimen:  Blood from Arm, Right Updated:  07/09/20 0925     Sodium 140 mmol/L      Potassium 3 9 mmol/L      Chloride 106 mmol/L      CO2 23 mmol/L      ANION GAP 11 mmol/L      BUN 14 mg/dL      Creatinine 1 04 mg/dL      Glucose 125 mg/dL      Calcium 9 1 mg/dL      AST 10 U/L      ALT 17 U/L      Alkaline Phosphatase 102 U/L      Total Protein 7 7 g/dL      Albumin 3 7 g/dL      Total Bilirubin 0 22 mg/dL      eGFR 100 ml/min/1 73sq m     Narrative:       National Kidney Disease Foundation guidelines for Chronic Kidney Disease (CKD):     Stage 1 with normal or high GFR (GFR > 90 mL/min/1 73 square meters)    Stage 2 Mild CKD (GFR = 60-89 mL/min/1 73 square meters)    Stage 3A Moderate CKD (GFR = 45-59 mL/min/1 73 square meters)    Stage 3B Moderate CKD (GFR = 30-44 mL/min/1 73 square meters)    Stage 4 Severe CKD (GFR = 15-29 mL/min/1 73 square meters)    Stage 5 End Stage CKD (GFR <15 mL/min/1 73 square meters)  Note: GFR calculation is accurate only with a steady state creatinine    Magnesium [[id number]]  (Normal) Collected:  07/09/20 0835    Lab Status:  Final result Specimen:  Blood from Arm, Right Updated:  07/09/20 0925     Magnesium 2 1 mg/dL     Phosphorus [[id number]]  (Normal) Collected:  07/09/20 0835    Lab Status:  Final result Specimen:  Blood from Arm, Right Updated:  07/09/20 0925     Phosphorus 3 1 mg/dL     CBC and differential [[id number]]  (Abnormal) Collected:  07/09/20 0835    Lab Status:  Final result Specimen:  Blood from Arm, Right Updated:  07/09/20 0845     WBC 12 90 Thousand/uL      RBC 4 51 Million/uL      Hemoglobin 13 6 g/dL      Hematocrit 41 0 %      MCV 91 fL      MCH 30 2 pg      MCHC 33 2 g/dL      RDW 12 8 %      MPV 10 1 fL      Platelets 577 Thousands/uL      nRBC 0 /100 WBCs      Neutrophils Relative 66 %      Immat GRANS % 0 %      Lymphocytes Relative 22 %      Monocytes Relative 10 %      Eosinophils Relative 2 %      Basophils Relative 0 %      Neutrophils Absolute 8 55 Thousands/µL      Immature Grans Absolute 0 05 Thousand/uL      Lymphocytes Absolute 2 80 Thousands/µL      Monocytes Absolute 1 27 Thousand/µL      Eosinophils Absolute 0 19 Thousand/µL      Basophils Absolute 0 04 Thousands/µL     Lyme disease, PCR [[id number]] Collected:  07/09/20 0835    Lab Status:   In process Specimen:  Blood from Arm, Right Updated:  07/09/20 0842                 No orders to display              Procedures  ECG 12 Lead Documentation Only  Date/Time: 7/9/2020 9:16 AM  Performed by: Ant Yu MD  Authorized by: Ant Yu MD     Indications / Diagnosis:  0759  ECG reviewed by me, the ED Provider: yes    Patient location:  ED  Previous ECG:     Previous ECG:  Unavailable  Interpretation:     Interpretation: abnormal    Rate:     ECG rate:  31    ECG rate assessment: bradycardic    Rhythm:     Rhythm: A-V block    Ectopy:     Ectopy: none    QRS:     QRS axis:  Normal  Conduction:     Conduction: abnormal      Abnormal conduction: 2nd degree (Mobitz 2)    ST segments:     ST segments:  Normal  T waves:     T waves: normal      CriticalCare Time  Performed by: Ant Yu MD  Authorized by: Ant Yu MD     Critical care provider statement:     Critical care time (minutes):  60    Critical care time was exclusive of:  Separately billable procedures and treating other patients and teaching time    Critical care was necessary to treat or prevent imminent or life-threatening deterioration of the following conditions:  Cardiac failure    Critical care was time spent personally by me on the following activities:  Blood draw for specimens, obtaining history from patient or surrogate, discussions with consultants, development of treatment plan with patient or surrogate, evaluation of patient's response to treatment, examination of patient, interpretation of cardiac output measurements, ordering and performing treatments and interventions, ordering and review of laboratory studies, re-evaluation of patient's condition and review of old charts             ED Course  ED Course as of Jul 09 1657   Thu Jul 09, 2020   0834 Bean Benoit of cardiology and sent her a copy of the EKG, she agrees with type II mobitz and requests transfer to [facility] for possible PPM           0905 I spoke with Dr Marcelino Ndiaye of Critical Care at [facility]  He agrees with plan for transfer and accepts the patient to his service  [address] [facility] (interventional cardiology) contacted me and is aware of the patient and on the lookout for his arrival                                                  MDM  Number of Diagnoses or Management Options  Mobitz type 2 second degree heart block: new and requires workup  Diagnosis management comments: Background: 25 y o  male presents with shortness of breath and palpitations; ekg shows mobitz type 2    Differential DX includes but is not limited to: mobitz type 2, possible lyme disease, possible endocarditis    Plan: urgent cardiology consult, cardiac workup, probable transfer          Amount and/or Complexity of Data Reviewed  Clinical lab tests: ordered    Risk of Complications, Morbidity, and/or Mortality  Presenting problems: high  Diagnostic procedures: high  Management options: high    Patient Progress  Patient progress: stable        Disposition  Final diagnoses:   Mobitz type 2 second degree heart block     Time reflects when diagnosis was documented in both MDM as applicable and the Disposition within this note     Time User Action Codes Description Comment    7/9/2020  9:07 AM Vandana Lipoma B Add [I44 1] Mobitz type 2 second degree heart block       ED Disposition     ED Disposition Condition Date/Time Comment    Transfer to Another Facility-In Network  Thu Jul 9, 2020  9:07 AM Nils Moritz should be transferred out to [facility] under Dr Marcelino Ndiaye MD Documentation      Most Recent Value   Patient Condition  The patient has been stabilized such that within reasonable medical probability, no material deterioration of the patient condition or the condition of the unborn child(catalina) is likely to result from the transfer   Reason for Transfer  Level of Care needed not available at this facility   Benefits of Transfer  Specialized equipment and/or services available at the receiving facility (Include comment)________________________ [facility] ([facility]) Cardiology for Possible Pacemaker]   Risks of Transfer  Potential for delay in receiving treatment, Potential deterioration of medical condition, Possible worsening of condition or death during transfer   Accepting Physician  Dr Looney Client Name, [address]    (Name & Tel number)  Jayson Carcamo    Transported by (Company and Unit #)    [employer]  Provider Certification  General risk, such as traffic hazards, adverse weather conditions, rough terrain or turbulence, possible failure of equipment (including vehicle or aircraft), or consequences of actions of persons outside the control of the transport personnel, Unanticipated needs of medical equipment and personnel during transport, Risk of worsening condition, The possibility of a transport vehicle being unavailable      RN Documentation      [address] Name, [address]    (Name & Tel number)  Jayson Carcamo    Transported by (Company and Unit #)    Evi Khan Follow-up Information    None         Discharge Medication List as of 7/9/2020 10:52 AM      CONTINUE these medications which have NOT CHANGED    Details   naproxen (NAPROSYN) 500 mg tablet Take 1 tablet (500 mg total) by mouth 2 (two) times a day with meals, Starting Mon 10/14/2019, Print           No discharge procedures on file      PDMP Review     None          ED Provider  Electronically Signed by           Sonali Roberson MD  07/09/20 6399

## 2020-07-09 NOTE — ANESTHESIA POSTPROCEDURE EVALUATION
Post-Op Assessment Note    CV Status:  Stable  Pain Score: 0    Pain management: adequate     Mental Status:  Alert and awake   Hydration Status:  Euvolemic and stable   PONV Controlled:  Controlled   Airway Patency:  Patent and adequate   Post Op Vitals Reviewed: Yes      Staff: CRNA   Comments: patient awake, following commands, LYN Perez, report given to EPS RN, Vital sign as per EPIC, no further interventions @ this time            BP   91/53   Temp      Pulse 100   Resp   12   SpO2   100 6L

## 2020-07-09 NOTE — EMTALA/ACUTE CARE TRANSFER
[address] [place] [address]  Dept: [phone]      EMTALA TRANSFER CONSENT    NAME Roderick Almaraz                                         1995                              MRN [id number]    I have been informed of my rights regarding examination, treatment, and transfer   by Dr Kaci Hdz MD    Benefits: Specialized equipment and/or services available at the receiving facility (Include comment)________________________(Interventional Cardiology for Possible Pacemaker)    Risks: Potential for delay in receiving treatment, Potential deterioration of medical condition, Possible worsening of condition or death during transfer      Consent for Transfer:  I acknowledge that my medical condition has been evaluated and explained to me by the emergency department physician or other qualified medical person and/or my attending physician, who has recommended that I be transferred to the service of  Accepting Physician: Dr Majo Chapman at [address] [place] [address] Name, [address] : [address], [address] [place] [address]  The above potential benefits of such transfer, the potential risks associated with such transfer, and the probable risks of not being transferred have been explained to me, and I fully understand them  The doctor has explained that, in my case, the benefits of transfer outweigh the risks  I agree to be transferred  I authorize the performance of emergency medical procedures and treatments upon me in both transit and upon arrival at the receiving facility  Additionally, I authorize the release of any and all medical records to the receiving facility and request they be transported with me, if possible  I understand that the safest mode of transportation during a medical emergency is an ambulance and that the Hospital advocates the use of this mode of transport   Risks of traveling to the receiving facility by car, including absence of medical control, life sustaining equipment, such as oxygen, and medical personnel has been explained to me and I fully understand them  (KRISTI CORRECT BOX BELOW)  [  ]  I consent to the stated transfer and to be transported by ambulance/helicopter  [  ]  I consent to the stated transfer, but refuse transportation by ambulance and accept full responsibility for my transportation by car  I understand the risks of non-ambulance transfers and I exonerate the Hospital and its staff from any deterioration in my condition that results from this refusal     X___________________________________________    DATE  20  TIME________  Signature of patient or legally responsible individual signing on patient behalf           RELATIONSHIP TO PATIENT_________________________          Provider Certification    NAME Roderick Almaraz                                         1995                              MRN [id number]    A medical screening exam was performed on the above named patient  Based on the examination:    Condition Necessitating Transfer The encounter diagnosis was Mobitz type 2 second degree heart block      Patient Condition: The patient has been stabilized such that within reasonable medical probability, no material deterioration of the patient condition or the condition of the unborn child(catalina) is likely to result from the transfer    Reason for Transfer: Level of Care needed not available at this facility    Transfer Requirements:  [email or web address], [address] [facility]   · Space available and qualified personnel available for treatment as acknowledged by Karla Healy  · Agreed to accept transfer and to provide appropriate medical treatment as acknowledged by       Dr Majo Chapman  · Appropriate medical records of the examination and treatment of the patient are provided at the time of transfer   [address], [address] _______  · Transfer will be performed by qualified personnel from      and appropriate transfer equipment as required, including the use of necessary and appropriate life support measures  Provider Certification: I have examined the patient and explained the following risks and benefits of being transferred/refusing transfer to the patient/family:  General risk, such as traffic hazards, adverse weather conditions, rough terrain or turbulence, possible failure of equipment (including vehicle or aircraft), or consequences of actions of persons outside the control of the transport personnel, Unanticipated needs of medical equipment and personnel during transport, Risk of worsening condition, The possibility of a transport vehicle being unavailable      Based on these reasonable risks and benefits to the patient and/or the unborn child(catalina), and based upon the information available at the time of the patients examination, I certify that the medical benefits reasonably to be expected from the provision of appropriate medical treatments at another medical facility outweigh the increasing risks, if any, to the individuals medical condition, and in the case of labor to the unborn child, from effecting the transfer      X____________________________________________ DATE 07/09/20        TIME_______      ORIGINAL - SEND TO MEDICAL RECORDS   COPY - SEND WITH PATIENT DURING TRANSFER

## 2020-07-09 NOTE — ASSESSMENT & PLAN NOTE
Patient noted to high degree AV block  Temporary pacemaker placed by Cardiology  Discussed with Cardiology  Highly suspicious for Lyme disease  Follow-up on Lyme PCR  Discussed with infectious disease empirical treatment for Lyme disease  Monitor closely

## 2020-07-09 NOTE — PLAN OF CARE
Problem: CARDIOVASCULAR - ADULT  Goal: Maintains optimal cardiac output and hemodynamic stability  Description  INTERVENTIONS:  - Monitor I/O, vital signs and rhythm  - Monitor for S/S and trends of decreased cardiac output  - Administer and titrate ordered vasoactive medications to optimize hemodynamic stability  - Assess quality of pulses, skin color and temperature  - Assess for signs of decreased coronary artery perfusion  - Instruct patient to report change in severity of symptoms  Outcome: Progressing  Goal: Absence of cardiac dysrhythmias or at baseline rhythm  Description  INTERVENTIONS:  - Continuous cardiac monitoring, vital signs, obtain 12 lead EKG if ordered  - Administer antiarrhythmic and heart rate control medications as ordered  - Monitor electrolytes and administer replacement therapy as ordered  Outcome: Progressing     Problem: METABOLIC, FLUID AND ELECTROLYTES - ADULT  Goal: Electrolytes maintained within normal limits  Description  INTERVENTIONS:  - Monitor labs and assess patient for signs and symptoms of electrolyte imbalances  - Administer electrolyte replacement as ordered  - Monitor response to electrolyte replacements, including repeat lab results as appropriate  - Instruct patient on fluid and nutrition as appropriate  Outcome: Progressing  Goal: Fluid balance maintained  Description  INTERVENTIONS:  - Monitor labs   - Monitor I/O and WT  - Instruct patient on fluid and nutrition as appropriate  - Assess for signs & symptoms of volume excess or deficit  Outcome: Progressing  Goal: Glucose maintained within target range  Description  INTERVENTIONS:  - Monitor Blood Glucose as ordered  - Assess for signs and symptoms of hyperglycemia and hypoglycemia  - Administer ordered medications to maintain glucose within target range  - Assess nutritional intake and initiate nutrition service referral as needed  Outcome: Progressing     Problem: INFECTION - ADULT  Goal: Absence or prevention of progression during hospitalization  Description  INTERVENTIONS:  - Assess and monitor for signs and symptoms of infection  - Monitor lab/diagnostic results  - Monitor all insertion sites, i e  indwelling lines, tubes, and drains  - Monitor endotracheal if appropriate and nasal secretions for changes in amount and color  - [place] appropriate cooling/warming therapies per order  - Administer medications as ordered  - Instruct and encourage patient and family to use good hand hygiene technique  - Identify and instruct in appropriate isolation precautions for identified infection/condition  Outcome: Progressing     Problem: SAFETY ADULT  Goal: Patient will remain free of falls  Description  INTERVENTIONS:  - Assess patient frequently for physical needs  -  Identify cognitive and physical deficits and behaviors that affect risk of falls    -  [place] fall precautions as indicated by assessment   - Educate patient/family on patient safety including physical limitations  - Instruct patient to call for assistance with activity based on assessment  - Modify environment to reduce risk of injury  - Consider OT/PT consult to assist with strengthening/mobility  Outcome: Progressing  Goal: Maintain or return to baseline ADL function  Description  INTERVENTIONS:  -  Assess patient's ability to carry out ADLs; assess patient's baseline for ADL function and identify physical deficits which impact ability to perform ADLs (bathing, care of mouth/teeth, toileting, grooming, dressing, etc )  - Assess/evaluate cause of self-care deficits   - Assess range of motion  - Assess patient's mobility; develop plan if impaired  - Assess patient's need for assistive devices and provide as appropriate  - Encourage maximum independence but intervene and supervise when necessary  - Involve family in performance of ADLs  - Assess for home care needs following discharge   - Consider OT consult to assist with ADL evaluation and planning for discharge  - Provide patient education as appropriate  Outcome: Progressing  Goal: Maintain or return mobility status to optimal level  Description  INTERVENTIONS:  - Assess patient's baseline mobility status (ambulation, transfers, stairs, etc )    - Identify cognitive and physical deficits and behaviors that affect mobility  - Identify mobility aids required to assist with transfers and/or ambulation (gait belt, sit-to-stand, lift, walker, cane, etc )  - [place] fall precautions as indicated by assessment  - Record patient progress and toleration of activity level on Mobility SBAR; progress patient to next Phase/Stage  - Instruct patient to call for assistance with activity based on assessment  - Consider rehabilitation consult to assist with strengthening/weightbearing, etc   Outcome: Progressing     Problem: DISCHARGE PLANNING  Goal: Discharge to home or other facility with appropriate resources  Description  INTERVENTIONS:  - Identify barriers to discharge w/patient and caregiver  - Arrange for needed discharge resources and transportation as appropriate  - Identify discharge learning needs (meds, wound care, etc )  - Arrange for interpretive services to assist at discharge as needed  - Refer to Case Management Department for coordinating discharge planning if the patient needs post-hospital services based on physician/advanced practitioner order or complex needs related to functional status, cognitive ability, or social support system  Outcome: Progressing     Problem: Knowledge Deficit  Goal: Patient/family/caregiver demonstrates understanding of disease process, treatment plan, medications, and discharge instructions  Description  Complete learning assessment and assess knowledge base    Interventions:  - Provide teaching at level of understanding  - Provide teaching via preferred learning methods  Outcome: Progressing

## 2020-07-09 NOTE — H&P
H&P- Irena Carreno 1995, 25 y o  male MRN: [id number]    Unit/Bed#: [facility] 401-01 Encounter: [id number]    Primary Care Provider: No primary care provider on file  Date and time admitted to hospital: 7/9/2020 11:25 AM        * Second degree heart block  Assessment & Plan  Patient noted to high degree AV block  Temporary pacemaker placed by Cardiology  Discussed with Cardiology  Highly suspicious for Lyme disease  Follow-up on Lyme PCR  Discussed with infectious disease empirical treatment for Lyme disease  Monitor closely    History of latent tuberculosis  Assessment & Plan  Patient reports prior PPD test positive  Outpatient follow-up      VTE Prophylaxis: Low VTE risk score, vena dynes  / sequential compression device   Code Status:  Full code  POLST: There is no POLST form on file for this patient (pre-hospital)  Discussion with family:  Discussed with the patient, answered at bedside discussed in detail    Anticipated Length of Stay:  Patient will be admitted on an Inpatient basis with an anticipated length of stay of  more than 2 midnights  Justification for Hospital Stay:  Bradycardia high degree AV block requiring pacemaker placement close monitoring as outlined      Chief Complaint:       Palpitations  Transfer from [facility] ED    History of Present Illness:    Irena Carreno is a 25 y o  male who presents with palpitations  Patient initially presented to [facility] ED with complaints of palpitations since 2 weeks, his palpitations have worsened in the last 2 days  He also reports sweats, feeling dizzy lightheaded  Denies shortness of breath or chest pain  He reports headache  Patient reports he went hiking about to 3 weeks back for 2 days, he also reports history of swimming in water is otherwise  He reports history of fever, elbow swelling and pain  Denies history of rash  He reports headache    No history suggestive focal weakness  No prior no medical history, however reports PPD positive - and carries a diagnosis of latent tuberculosis  He smokes 1-2 cigarettes every day, alcohol occasionally  Reports history of drug abuse  Review of Systems:    Review of Systems   All other systems reviewed and are negative  Past Medical and Surgical History:     Past Medical History:   Diagnosis Date    ADHD     Anxiety     Back problem     Bipolar 1 disorder ([facility] [address] )     Depression     Psychiatric disorder     TB lung, latent        No past surgical history on file  Meds/Allergies:    Prior to Admission medications    Medication Sig Start Date End Date Taking? Authorizing Provider   naproxen (NAPROSYN) 500 mg tablet Take 1 tablet (500 mg total) by mouth 2 (two) times a day with meals  Patient not taking: Reported on 7/9/2020 10/14/19   Clementine Stephens MD     I have reviewed home medications with patient personally  Allergies: No Known Allergies    Social History:     Marital Status: Single   Occupation:   Patient Pre-hospital Living Situation: home  Patient Pre-hospital Level of Mobility:  Independent  Patient Pre-hospital Diet Restrictions: no  Substance Use History:   Social History     Substance and Sexual Activity   Alcohol Use Yes    Frequency: 2-3 times a week    Drinks per session: 3 or 4    Comment: socially     Social History     Tobacco Use   Smoking Status Light Tobacco Smoker    Packs/day: 0 20   Smokeless Tobacco Never Used     Social History     Substance and Sexual Activity   Drug Use Yes    Types: Marijuana       Family History:    No family history on file      Physical Exam:     Vitals:   Blood Pressure: 115/57 (07/09/20 1130)  Pulse: (!) 30 (07/09/20 1130)  Temperature: 98 4 °F (36 9 °C) (07/09/20 1130)  Temp Source: Oral (07/09/20 1130)  Respirations: 16 (07/09/20 1130)  SpO2: 100 % (07/09/20 1130)    Physical Exam    Comfortably lying in bed  Neck supple  Lungs clear to auscultation  Heart sounds S1-S2 noted bradycardia noted irregular  Abdomen soft nontender no organs palpable  Awake alert obeys simple commands  Pulses noted  No rash  No pedal edema    Additional Data:     Lab Results: I have personally reviewed pertinent reports  Results from last 7 days   Lab Units 07/09/20  0835   WBC Thousand/uL 12 90*   HEMOGLOBIN g/dL 13 6   HEMATOCRIT % 41 0   PLATELETS Thousands/uL 277   NEUTROS PCT % 66   LYMPHS PCT % 22   MONOS PCT % 10   EOS PCT % 2     Results from last 7 days   Lab Units 07/09/20  0835   SODIUM mmol/L 140   POTASSIUM mmol/L 3 9   CHLORIDE mmol/L 106   CO2 mmol/L 23   BUN mg/dL 14   CREATININE mg/dL 1 04   ANION GAP mmol/L 11   CALCIUM mg/dL 9 1   ALBUMIN g/dL 3 7   TOTAL BILIRUBIN mg/dL 0 22   ALK PHOS U/L 102   ALT U/L 17   AST U/L 10   GLUCOSE RANDOM mg/dL 125       Imaging: I have personally reviewed pertinent reports  EKG, Pathology, and Other Studies Reviewed on Admission:   · EKG:  Second-degree AV block noted    Allscripts / Epic Records Reviewed: Yes     ** Please Note: This note has been constructed using a voice recognition system   **

## 2020-07-10 LAB — HIV 1+2 AB+HIV1 P24 AG SERPL QL IA: NORMAL

## 2020-07-10 NOTE — UTILIZATION REVIEW
Initial Clinical Review    Admission: Date/Time/Statement: Admission Orders (From admission, onward)     Ordered        07/09/20 1206  Inpatient Admission  Once                   Orders Placed This Encounter   Procedures    Inpatient Admission     Standing Status:   Standing     Number of Occurrences:   1     Order Specific Question:   Admitting Physician     Answer:   Ry Larkin     Order Specific Question:   Level of Care     Answer:   Level 1 Stepdown [13]     Order Specific Question:   Estimated length of stay     Answer:   More than 2 Midnights     Order Specific Question:   Certification     Answer:   I certify that inpatient services are medically necessary for this patient for a duration of greater than two midnights  See H&P and MD Progress Notes for additional information about the patient's course of treatment  Assessment/Plan: 25year old male, presented to the ED @  [facility], Transferred to [facility], higher level of care, via EMS  Admitted as Inpatient due to 2nd degree heart block  KACIE Joni:  C/o palpitations for 2 weeks - worsening   + for diaphoretic, dizzy and lightheadedness, headaches  High degree AV block, Temp pacer placed by cardio  Patient has a history of being outside -camping, possible exposure to ticks  He also gives a history of fever about 2-3 days after his last camping trip  Blood test has been sent for Lyme disease  High suspicious for Lyme Disease  Telemetry, Monitor closely  07/09/2020  Consult EPS:  ECG:  Intermittent CHB / Paroxysmal AV block  Patient has a history of being outside -camping, possible exposure to ticks  He also gives a history of fever about 2-3 days after his last camping trip  Blood test has been sent for Lyme disease    Procedure Date 07/09/2020  Procedure:  Temporary permanent pacemaker placement  Will be in body for >48 hrs      VVI pacing  Pacer rate- 40/m  Threshold -1 mV   Pacing voltage 5 mv  Anesthesia- local lidocaine     VTE Prophylaxis: Low VTE risk score, vena dynes  / sequential compression device     Pt signed AMA paperwork and left following  removal of temporary pacer  Triage Vitals   Temperature Pulse Respirations Blood Pressure SpO2   20 1130 20 1130 20 1130 20 1130 20 1130   98 4 °F (36 9 °C) (!) 30 16 115/57 100 %      Temp Source Heart Rate Source Patient Position - Orthostatic VS BP Location FiO2 (%)   20 1130 20 1130 20 1130 20 1130 --   Oral Monitor Lying Left arm       Pain Score       20 1145       No Pain        Wt Readings from Last 1 Encounters:   20 86 5 kg (190 lb 11 2 oz)     Additional Vital Signs:   Date/Time  Temp  Pulse  Resp  BP  MAP (mmHg)  SpO2  O2 Device  Patient Position - Orthostatic VS   20 1900  98 6 °F (37 °C)  --  16  --  --  99 %  --  --   20 1830  --  44Abnormal   --  108/52  71  --  --  Lying   20 1800  --  42Abnormal   --  111/57  82  --  --  Lying   20 1745  --  44Abnormal   --  97/55  73  --  --  Lying   20 1730  --  40Abnormal   --  99/55  71  --  --  Lying   20 1715  --  42Abnormal   --  105/57  77  --  --  Lying   20 1700  --  42Abnormal   --  112/55  78  --  --  Lying   20 1645  --  44Abnormal   --  104/59  77  --  --  Lying     Date and Time Eye Opening Best Verbal Response Best Motor Response [place] Coma Scale Score   20 1630 4 5 6 15   20 1145 4 5 6 15   20 0820 4 5 6 15     2020 @ 1803  Chest X:  No acute cardiopulmonary disease   Intact pacer lead    2020 @ 0819  EC, Sinus rhythm with 2nd degree A-V block with 3:1 A-V conduction    Pertinent Labs/Diagnostic Test Results:   Results from last 7 days   Lab Units 20  0857   SARS-COV-2  Negative     Results from last 7 days   Lab Units 20  0835   WBC Thousand/uL 12 90*   HEMOGLOBIN g/dL 13 6   HEMATOCRIT % 41 0   PLATELETS Thousands/uL 277   NEUTROS ABS Thousands/µL 8 55*     Results from last 7 days   Lab Units 07/09/20  0835   SODIUM mmol/L 140   POTASSIUM mmol/L 3 9   CHLORIDE mmol/L 106   CO2 mmol/L 23   ANION GAP mmol/L 11   BUN mg/dL 14   CREATININE mg/dL 1 04   EGFR ml/min/1 73sq m 100   CALCIUM mg/dL 9 1   MAGNESIUM mg/dL 2 1   PHOSPHORUS mg/dL 3 1     Results from last 7 days   Lab Units 07/09/20  0835   AST U/L 10   ALT U/L 17   ALK PHOS U/L 102   TOTAL PROTEIN g/dL 7 7   ALBUMIN g/dL 3 7   TOTAL BILIRUBIN mg/dL 0 22     Results from last 7 days   Lab Units 07/09/20  0835   GLUCOSE RANDOM mg/dL 125     Results from last 7 days   Lab Units 07/09/20  1804 07/09/20  1302   TROPONIN I ng/mL 0 03 <0 02     Results from last 7 days   Lab Units 07/09/20  1302   TSH 3RD GENERATON uIU/mL 1 610     Results from last 7 days   Lab Units 07/09/20  8663   BLOOD CULTURE  Received in Microbiology Lab  Culture in Progress  Received in Microbiology Lab  Culture in Progress  Past Medical History:   Diagnosis Date    ADHD     Anxiety     Back problem     Bipolar 1 disorder ([facility] [address]     Depression     Psychiatric disorder     TB lung, latent      Admitting Diagnosis: Second degree heart block [I44 1]  Age/Sex: 25 y o  male  Admission Orders:  Scheduled Medications:  No current facility-administered medications for this encounter  Continuous IV Infusions:  No current facility-administered medications for this encounter  PRN Meds:  No current facility-administered medications for this encounter  IP CONSULT TO CARDIOLOGY EPS    Network Utilization Review Department  [email or web address]  ATTENTION: Please call with any questions or concerns to [phone] and carefully listen to the prompts so that you are directed to the right person   All voicemails are confidential   [facility] all requests for admission clinical reviews, approved or denied determinations and any other requests to dedicated fax number below belonging to the campus where the patient is receiving treatment   List of dedicated fax numbers for the Facilities:  [address] [facility] [address] DENIALS (Administrative/Medical Necessity) [phone]   [address] (Maternity/NICU/Pediatrics) [phone]   Ashkan Challenger [phone]   Jeremy Oliva [phone]   [address] [place] [address] [phone]   [address] [facility]  [phone]   [address] [facility] [address] [place] [address] [phone]   Dixie Shook [phone]   [address] [facility]  [address] [facility] [address] [facility] [phone]

## 2020-07-10 NOTE — DISCHARGE INSTRUCTIONS
Please call the cardiologist for an appointment the next day after discharge  Heart Block   WHAT YOU NEED TO KNOW:   Heart block is a problem with the flow of electrical signals in your heart  The electrical signals control the way your heart beats  With heart block, these signals are delayed or interrupted  This affects the way your heart pumps blood  WHILE YOU ARE HERE:   Informed consent  is a legal document that explains the tests, treatments, or procedures that you may need  Informed consent means you understand what will be done and can make decisions about what you want  You give your permission when you sign the consent form  You can have someone sign this form for you if you are not able to sign it  You have the right to understand your medical care in words you know  Before you sign the consent form, understand the risks and benefits of what will be done  Make sure all your questions are answered  Medicine:   · Heart medicine  helps your heart beat more regularly  · Antiplatelets  help prevent blood clots  This medicine makes it more likely for you to bleed or bruise  · Blood thinners  keep blood clots from forming  Clots may cause heart attacks, strokes, or death  This medicine makes it more likely for you to bleed or bruise  Tests:   · Blood tests  may be done to check for infection, electrolyte levels, or other causes of heart block  · A chest x-ray  will show the size of your heart and check for fluid in your lungs  · An exercise stress test  helps healthcare providers see the changes that take place in your heart while it is under stress  Healthcare providers may place stress on your heart with exercise or medicine  Ask your healthcare provider for more information about this test   Monitoring: An EKG or heart monitor records your heart rhythm and how fast your heart beats  It is used to check the electrical activity to see if there is damage to your heart     Treatment:  You may need a pacemaker  This is a small device to help your heart beat at a normal speed and in a regular rhythm  It may be temporary or permanent  A temporary pacemaker is a short-term treatment in the hospital  The pacemaker is applied to your skin with sticky pads or placed into a vein in your neck or chest  A pacing device helps keep your heartbeat stable  A permanent pacemaker is put under the skin of your chest or abdomen during surgery  A tiny battery creates electrical impulses that keep your heart rate regular  RISKS:   You may develop blood clots that lead to a heart attack or stroke  These conditions may be life-threatening  Heart block may lead to other heart conditions, such as an uneven heartbeat  Without treatment, health conditions that are causing your heart block may not be diagnosed  These health conditions may get worse over time  CARE AGREEMENT:   You have the right to help plan your care  Learn about your health condition and how it may be treated  Discuss treatment options with your caregivers to decide what care you want to receive  You always have the right to refuse treatment  © 2016 [address] is for End User's use only and may not be sold, redistributed or otherwise used for commercial purposes  All illustrations and images included in CareNotes® are the copyrighted property of A D A M , Inc  or curated.by  The above information is an  only  It is not intended as medical advice for individual conditions or treatments  Talk to your doctor, nurse or pharmacist before following any medical regimen to see if it is safe and effective for you  Heart Block   WHAT YOU NEED TO KNOW:   Heart block is a problem with the flow of electrical signals in your heart  The electrical signals control the way your heart beats  With heart block, these signals are delayed or interrupted completely  This affects the way your heart beats     DISCHARGE INSTRUCTIONS:   Call 911 if:   · You have any of the following signs of a heart attack:      ¨ Squeezing, pressure, or pain in your chest that lasts longer than 5 minutes or returns    ¨ Discomfort or pain in your back, neck, jaw, stomach, or arm     ¨ Trouble breathing    ¨ Nausea or vomiting    ¨ Lightheadedness or a sudden cold sweat, especially with chest pain or trouble breathing    Contact your healthcare provider if:   · Your symptoms are worse or happen more often  · You have questions about your condition or care  Medicines:   · Heart medicine  helps your heart beat more regularly  · Take your medicine as directed  Contact your healthcare provider if you think your medicine is not helping or if you have side effects  Tell him or her if you are allergic to any medicine  Keep a list of the medicines, vitamins, and herbs you take  Include the amounts, and when and why you take them  Bring the list or the pill bottles to follow-up visits  Carry your medicine list with you in case of an emergency  Follow up with your healthcare provider or cardiologist as directed: You may need to return for other tests or treatments  Write down your questions so you remember to ask them during your visits  Manage your symptoms:   · Take your pulse regularly to check for any changes  Write down this information and take it with you to your visits with your healthcare provider  © 2017 [facility] Information is for End User's use only and may not be sold, redistributed or otherwise used for commercial purposes  All illustrations and images included in CareNotes® are the copyrighted property of A D A M , Inc  or Tim Almazan  The above information is an  only  It is not intended as medical advice for individual conditions or treatments  Talk to your doctor, nurse or pharmacist before following any medical regimen to see if it is safe and effective for you

## 2020-07-10 NOTE — QUICK NOTE
Given sign out that patient was requesting to leave AMA  Cardiology coming in house to remove temporary pacer  Spoke with patient  He was able to understand the risks of leaving, which included infection, syncope, and death  Emphasis was placed that patient is high risk of developing complications  Patient able to understand risks and explain them back  Instructions were provided to patient and fiance for reasons he should immediately return to the hospital or call emergency services  While fiance continued to request for patient to stay, patient stated he when he is in an enviroment where he "doesn't feel free" he needs to get out and go back home  He has many demons and does not want to "black out and hurt people" because of this  PT states no accomodation can be made to make the him more comfortable while admitted  He does seem interested in treatment in PPM, but does not want to stay inpatient to receive it  AVS given to patient as well as physical script for doxycycline  Electronic prescription also sent  Given dose of doxy prior to leaving  Pt signed [facility] paperwork and left following  removal of temporary pacer

## 2020-07-10 NOTE — PROGRESS NOTES
Chart Update, Patient Left AMA  Spoke with patient at length at bedside as he is adamant about leaving AMA  Unfortunately due to his complete heart block he has a temporary pacer that must be removed before leaving the hospital given that this line increases his risk of infective endocarditis which can lead to death  Spoke candidly with the patient and expressly stated that leaving the hospital AMA after safely removing pacemaker at bedside puts him at risk of syncope and complications of unexpected syncope, organ failure, and sudden cardiac death  Reiterated again explicitly that removing pacemaker and leaving AMA can result in sudden cardiac death  Patient expressed understanding of the implications of his decision to leave AMA including possible short and long-term complications  He expressed understanding of alternative treatment options including waiting more safely for resolution of complete heart block and leaving in a safe manner as well as the potential of complete heart block not resolving requiring permanent pacemaker  He was able to express his understanding of this clearly and remained steadfast in his decision to leave against medical advise  Throughout our conversation I attempted to understand reasoning for wanting to leave AMA  He expressed frustration that he felt lied to by medical staff in that he felt I was attempting to delay his departure from the hospital  I explained that as the cardiology fellow I sought expert help from electrophysiologists who routinely place and remove these devices and that a delay in his ability to leave AMA was out of concern for wanting to remove the device in the safest manner possible  As such Dr Shavonne Villagran arrived at bedside to assist in temporary pacemaker removal  He expressed understanding and appreciation of this   Unfortunately could not better understand his reasoning for wanting to leave and stated "I just want to go home" and would not go into further detail  Temporary pacemaker removed at bedside with Dr Quincy Hammer and detailed discussion as below  Pressure held without bleeding  Additional bandages and gauzed provided to patient in case of bleed and instructed that if gauze becomes bloody or notice it bleeds to remove, hold pressure with gauze for at least 5 minutes, and replace bandage  Instructed to keep bandage in place to prevent infection of the area and to keep dry  Additional dose of doxy given at bedside before leaving  Script sent to pharmacy and additional script printed out and given to patient just in case  Encouraged him to be compliant with medication in the hopes that 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 and improve heart block  Hopeful given sinus rhythm at the moment around 45 beats per minute but cannot predict recovery time, if at all  Encouraged him not to drive as this can harm himself and others  Encouraged him not to exert himself given his ventricular escape rhythm was in the 30s on admission and unclear if his heart rate will compensate appropriately and may cause cerebral hypoperfusion  Expressly told him to please return if he gets worse and that he will continue to be provided care if he requires it  His girlfriend at bedside has an Apple watch and encouraged him to use the heart rate monitor to track his ventricular rate  I again underlined that this does not replace proper medical treatment that I still advised him to stay for monitoring  Also encouraged him to call 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

## 2020-07-10 NOTE — PROGRESS NOTES
Upon finishing report with the day nurse I was alerted by another nurse at approximately 1950 that the patient had pulled off his telemetry and had began making verbal threats to his fiance and (in the patients words) to "anyone who got in his way of leaving " I then entered the room and a fellow nurse called a control team as the patient was getting increasingly agitated accompanied with continued verbal threats to his fiance and myself  The patient then told to me that he wanted to leave AMA and stated multiple times, "I just wanted to go home " I explained to him the risks of leaving with the external pacemaker as well as the importance of leaving it in and continuing with his hospital stay  Unfortunatly, aware of the risks, the patient was still adamant on leaving so I asked him if he would be willing to wait for the doctor to come speak with him before making any decisions  After he agreed the control team was canceled as he was no longer making threats and the patient continued to wait  The supervisor and myself continued to monitor him bedside  SLIM MD Dr Paulino Lynch arrived to the floor at approximately 2000 and proceeded to explain to him the risks of leaving  However, the patient still wanted to leave  Dr Cristin Miranda asked that I get in contact with the cardiology fellow on call and I spoke with Ravin Vera who then escalated the situation to his attending  At 2030 the patient was getting increasingly agitated again and headed toward the elevator stating "Im going to leave if no one shows up, Im done waiting " A second control team was called and security and the supervisor arrived bedside  He was again educated on the importance of waiting for the cardiology fellow to remove his external pacemaker before leaving  Patient pulled out his peripheral IV and attempted to remove his second one when I offered to remove it for him   JOSE ALFREDO Forbes from [address] [place] [address] arrived bedside and explained the risks of leaving to the patient  She then asked him to fill out the [facility] form with his fiance and I present  Patient willingly signed the form aware of the risks including death  Harry Calvin arrived bedside at approximately 2100 and spoke with the patient in more detail about the risks of taking his external pacemaker out and what could potentially happen to him without it  Despite knowing that he could have a cardiac arrest and die without it, the patient insisted that it be removed  Dr Juan Hinton arrived bedside and again reiterated the risks involved in removing the pacer  I proceeded to put the patient on the monitor so we could watch his cardiac rhythm during/after the pacer removal  A separate form was created by Juan Hinton to ensure the patient and himself were on the same page about the pacer removal  Both the patient and Dr Juan Hinton signed the form and the pacer was removed  He remained in sinus bradycardia and I removed him from the monitor per Dr Juan Hinton  Per cardiology and SLIM I gave the patient his first dose of doxycycline and tylenol for pain  I went through his discharge paperwork with him and his fiance  The patient was given his discharge papers along with a written prescription for his doxycycline  He was able to leave the hospital asymptomatic without dizziness or chest pain but did not take home his discharge paperwork as I found them after he had left next to his bed       Rayna Parada RN

## 2020-07-10 NOTE — DISCHARGE SUMMARY
Patient left against medical advise      Discharge Summary - [address] [facility]    Patient Information: Lynn Pierre 25 y o  male MRN: [id number]  Unit/Bed#: [facility] 401-01 Encounter: [id number]    Discharging Physician / Practitioner: Chloe Lopez PA-C  PCP: No primary care provider on file  Admission Date: 7/9/2020  Discharge Date:  07/09/2020    Reason for Admission:  Palpitations, transferred from [facility]    Discharge Diagnoses:     Principal Problem:    Second degree heart block  Active Problems:    History of latent tuberculosis  Resolved Problems:    * No resolved hospital problems  *      Consultations During Hospital Stay:  · Electrophysiology    Procedures Performed:   · 7/9/2020: Temporary permanent pacemaker placement     Significant Findings / Test Results:     · Troponin x 2: negative   · TSH: WNL  · Novel coronavirus PCR- negative       Incidental Findings:   · None      Test Results Pending at Discharge (will require follow up): · Lyme disease PCR  · Lyme antibody profile       Complications:  Pt left [facility]    Hospital Course:     Lynn Pierre is a 25 y o  male patient who originally presented to [address] [place] [address] on 7/9/2020 due to Chest pain, palpitations, shortness of breath x 3 days  Pt was found to have a 2nd degree AV block, bradycardic in the 30s and was transferred to [facility] for possible PPM placement  Pt was evaluated by EP  Now w/ complete heart block with paroxysmal AV block, 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 as pt had recent camping trip and fever  Decision was made to place a temporary permanent pacemaker  Following the procedure, the patient expressed he wished to leave  Multiple providers encouraged pt to stay  Risks of leaving were explained in detail including: infection, syncope, and death  Pt able to express understanding  Discharge information provided  Cardiology  removed temporary pacemaker prior to discharge    Please review the chart for details    Condition at Discharge: poor     Discharge Day Visit / Exam:     * Please refer to separate progress note for these details *    Discussion with Family: Discussion with pt and saroj at bedside  All information regarding discharge provided  Prescription for doxycycline both sent electronically and physically handed to fiance  All questions answered  Discharge instructions/Information to patient and family:   See after visit summary for information provided to patient and family  Discharge plan discussed with Cardiology in detail  Discharge plan discussed with the patientdavid at bedside  Spite of repeated requests to stay in hospital he was adamant and has left against medical advise  He was clearly informed on multiple occasions by multiple providers the adverse effects of leaving the hospital without treatment - consequences in the form of respiratory failure loss of consciousness and death were explained he verbalized understanding and he still wanted to leave and has left against medical advice  Provisions for Follow-Up Care:  See after visit summary for information related to follow-up care and any pertinent home health orders  Disposition:     Other: AMA    For Discharges to [facility]:   · Not Applicable to this Patient - Not Applicable to this Patient    Planned Readmission: no    Discharge Statement:  I spent >60 minutes discharging the patient  This time was spent on the day of discharge  I had direct contact with the patient on the day of discharge  Greater than 50% of the total time was spent examining patient, answering all patient questions, arranging and discussing plan of care with patient as well as directly providing post-discharge instructions  Additional time then spent on discharge activities  Discharge Medications:  See after visit summary for reconciled discharge medications provided to patient and family        ** Please Note: This note has been constructed using a voice recognition system   **

## 2020-07-13 LAB
B BURGDOR IGG PATRN SER IB-IMP: POSITIVE
B BURGDOR IGG+IGM SER-ACNC: 3.45 ISR (ref 0–0.9)
B BURGDOR IGM PATRN SER IB-IMP: POSITIVE
B BURGDOR18KD IGG SER QL IB: PRESENT
B BURGDOR23KD IGG SER QL IB: PRESENT
B BURGDOR23KD IGM SER QL IB: PRESENT
B BURGDOR28KD IGG SER QL IB: ABNORMAL
B BURGDOR30KD IGG SER QL IB: ABNORMAL
B BURGDOR39KD IGG SER QL IB: PRESENT
B BURGDOR39KD IGM SER QL IB: PRESENT
B BURGDOR41KD IGG SER QL IB: PRESENT
B BURGDOR41KD IGM SER QL IB: PRESENT
B BURGDOR45KD IGG SER QL IB: ABNORMAL
B BURGDOR58KD IGG SER QL IB: PRESENT
B BURGDOR66KD IGG SER QL IB: ABNORMAL
B BURGDOR93KD IGG SER QL IB: ABNORMAL

## 2020-07-14 LAB
BACTERIA BLD CULT: NORMAL
BACTERIA BLD CULT: NORMAL

## 2020-07-14 NOTE — PROGRESS NOTES
Did discuss that this patient's results came back to me positive for Lyme disease  Still reached out to the patient and spoke with his fiancee who reported that he was still symptomatic at home and instead sought medical care at [facility] on Saturday, had temporary wire placed for 3 days and was treated with IV antibiotics at which point his conduction disease improved and he was discharged on oral antibiotics  I let the patient know that I agreed with the plan to continue doxycycline for his complete heart block in the setting of Lyme disease

## 2020-07-17 LAB — B BURGDOR DNA SPEC QL NAA+PROBE: NEGATIVE

## 2020-07-24 LAB — B BURGDOR DNA SPEC QL NAA+PROBE: NEGATIVE

## 2020-08-21 NOTE — UTILIZATION REVIEW
URGENT/EMERGENT  INPATIENT/SPU AUTHORIZATION REQUEST    Date: 08/21/20            # Pages in this Request:     x New Request   Additional Information for PA#:     Office Contact Name:  No Finch Title: Utilization Review, Kailyn Nurse     Phone: [phone]  Ext  Availability (Date/Time): Wednesday - Friday 8 am- 4 pm    x Inpatient Review  SPU Review        Current       x Late Pick-up   · How your facility was first notified of the Late Pick-up: Paths Letter   · When your facility was first notified of the Late Pick-up (date): 8/18/2020         RECIPIENT INFORMATION    Recipient ID#:  [id number]    Recipient Name: Deanne Talavera       YOB: 1995  25 y o  Recipient Alias:     Gender:  X Male  Female Medicaid Eligibility ([address] [place] [address]): INSURANCE INFORMATION    (All other private or governmental health insurance benefits must be utilized prior to billing the [place] Program)    Was this admission the result of an MVA, other accident, assault, injury, fall, gunshot, bite etc ? Yes x No                   If yes, provide a brief description of the incident  Does the recipient have other insurance coverage? Yes x No        Insurance Company Name/Policy #      Did that insurance pay on this claim? Yes  No        Did that insurance deny this claim? Yes  No    If yes, reason for denial:      Does the recipient have Medicare? Yes x No        Did Medicare exhaust prior to this admission? Yes  No        Did Medicare partially pay this claim? Yes  No        Did that insurance deny this claim? Yes  No    If yes, reason for denial:          Was the recipient a prisoner at the time of admission?   Yes x No            PROVIDER INFORMATION    Hospital Name: [address] [place] [address] Provider ID#: [id number]    Admitting Physician Name:OBI Minor Provider ID#: [id number]        ADMISSION INFORMATION    Type of Admission: (please choose one)     ED     X Direct If yes, from where? [facility]      Transfer    If yes, transferring hospital (inpatient, rehab, or psych) Provider Name/Provider ID#: Admission Floor or Unit Type: Level 1 step down unit     Dates/Times:        ED Date/Time:         Observation Date/Time:         Admission Date/Time: 7/9/20 1125        Discharge or Transfer Date/Time: 7/9/2020 10:30 PM        DIAGNOSIS/PROCEDURE CODES    Primary Diagnosis Code/Primary Diagnosis Code description:  I44 2  Atrioventricular block, complete     R00 1  Bradycardia, unspecified   F41 9  Anxiety disorder, unspecified    Additional Diagnosis Code(s) and Description(s)-(up to three additional codes):    [id number]  Performance of Cardiac Pacing, Continuous  ocedure Code (one) and description:        CLINICAL INFORMATION - PRIOR ADMISSION ONLY    Is there a prior admission with a discharge date within 30 days of the date of this admission?    x No (Proceed to the next section - "Clinical Information - General Review Checklist:)      Yes (Provide the following information)     Prior admission dates:    MA Prior Authorization Number:        Review Outcome:     Diagnosis Code(s)/Description:    Procedure Code/Description:    Findings:    Treatment:    Condition on Discharge:   Vitals:    Labs:   Imaging:   Medications: Follow-up Instructions:    Disposition:        CLINICAL INFORMATION - GENERAL REVIEW CHECKLIST    EMERGENCY DEPARTMENT: (Proceed to "ADMISSION" if Direct Admission)    Presenting Signs/Symptoms:    Medication/treatment prior to arrival in the ED:    Past Medical History:     Clinical Exam:    Initial Vital Signs: (Temp, Pulse, Resp, and BP)       Pertinent Repeat Vital Signs: (include times they were obtained)    Pertinent Sustained Findings: (include times they were obtained)    Weight in Kilograms:    Pertinent Labs (results):    Radiology (results):    EKG (results):      Other tests (results):    Tests pending final results:    Treatment in the ED:      Other treatments:      Change in condition while in the ED:     Response to ED Treatment:          OBSERVATION: (Proceed to "ADMISSION" if Direct Admission)    Orders written during the observation period  Meds Name, dose, route, time, how may doses given:  PRN Meds Name, dose, route, time, how many doses given within the first 24 hrs :  IVs Type, rate, and total amt  ordered/given:  Labs, imaging, other:  Consults and findings:    Test Results during the observation period  Pertinent Lab tests (dates/results):  Culture results (blood, urine, spinal, wound, respiratory, etc ):  Imaging tests (dates/results):  EKG (dates/results): Other test (dates/results):  Tests pending (dates/results):    Surgical or Invasive Procedures during the observation period  Name of surgery/procedure:  Date & Time:  Patient Response:  Post-operative orders:  Operative Report/Findings:    Response to Treatment, Major Change in Condition, Major Charge in Treatment during the observation period          ADMISSION:    DIRECT Admissions Only:    Presenting Signs/Symptoms:  25year old male, presented to the ED @ [facility], Transferred to [facility], higher level of care, via EMS  Admitted as Inpatient due to 2nd degree heart block  [facility]:  C/o palpitations for 2 weeks - worsening   + for diaphoretic, dizzy and lightheadedness, headaches  High degree AV block, Temp pacer placed by cardio  Patient has a history of being outside -camping, possible exposure to ticks  He also gives a history of fever about 2-3 days after his last camping trip  Blood test has been sent for Lyme disease  High suspicious for Lyme Disease  Telemetry, Monitor closely      ·    ·   · Medication/treatment prior to arrival:  ·   · Past Medical History:    Past Medical History:   Diagnosis Date    ADHD     Anxiety     Back problem     Bipolar 1 disorder ([facility] [address] )     Depression     Psychiatric disorder     TB lung, latent ·   · Clinical Exam on admission:  ·   · Vital Signs on admission: (Temp, Pulse, Resp, and BP)  Triage Vitals   Temperature Pulse Respirations Blood Pressure SpO2   20 1130 20 1130 20 1130 20 1130 20 1130   98 4 °F (36 9 °C) (!) 30 16 115/57 100 %       Temp Source Heart Rate Source Patient Position - Orthostatic VS BP Location FiO2 (%)   20 1130 20 1130 20 1130 20 1130 --   Oral Monitor Lying Left arm         Pain Score           20 1145           No Pain             Date/Time   Temp   Pulse   Resp   BP   MAP (mmHg)   SpO2   O2 Device   Patient Position - Orthostatic VS   20 1900   98 6 °F (37 °C)   --   16   --   --   99 %   --   --   20 1830   --   44Abnormal    --   108/52   71   --   --   Lying   20 1800   --   42Abnormal    --   111/57   82   --   --   Lying   20 1745   --   44Abnormal    --   97/55   73   --   --   Lying   20 1730   --   40Abnormal    --   99/55   71   --   --   Lying   20 1715   --   42Abnormal    --   105/57   77   --   --   Lying   20 1700   --   42Abnormal    --   112/55   78   --   --   Lying   20 1645   --   44Abnormal    --   104/59   77   --   --   Lying       ·   · Weight in kilograms:   20 86 5 kg (190 lb 11 2 oz)       ALL Admissions:    Admission Orders and Other Orders written within the first 24 hrs after admission  Meds Name, dose, route, time, how may doses given:  PRN Meds Name, dose, route, time, how many doses given within the first 24 hrs :  IVs Type, rate, and total amt  ordered/given:  Labs, imaging, other:  2020 @ 1803  Chest X:  No acute cardiopulmonary disease   Intact pacer lead     2020 @ 0819  EC, Sinus rhythm with 2nd degree A-V block with 3:1 A-V conduction  Results from last 7 days   Lab Units 20  0857   SARS-COV-2   Negative           Results from last 7 days   Lab Units 20  0835   WBC Thousand/uL 12 90*   HEMOGLOBIN g/dL 13 6   HEMATOCRIT % 41 0   PLATELETS Thousands/uL 277   NEUTROS ABS Thousands/µL 8 55*           Results from last 7 days   Lab Units 07/09/20  0835   SODIUM mmol/L 140   POTASSIUM mmol/L 3 9   CHLORIDE mmol/L 106   CO2 mmol/L 23   ANION GAP mmol/L 11   BUN mg/dL 14   CREATININE mg/dL 1 04   EGFR ml/min/1 73sq m 100   CALCIUM mg/dL 9 1   MAGNESIUM mg/dL 2 1   PHOSPHORUS mg/dL 3 1           Results from last 7 days   Lab Units 07/09/20  0835   AST U/L 10   ALT U/L 17   ALK PHOS U/L 102   TOTAL PROTEIN g/dL 7 7   ALBUMIN g/dL 3 7   TOTAL BILIRUBIN mg/dL 0 22           Results from last 7 days   Lab Units 07/09/20  0835   GLUCOSE RANDOM mg/dL 125            Results from last 7 days   Lab Units 07/09/20  1804 07/09/20  1302   TROPONIN I ng/mL 0 03 <0 02           Results from last 7 days   Lab Units 07/09/20  1302   TSH 3RD GENERATON uIU/mL 1 610           Results from last 7 days   Lab Units 07/09/20  9083   BLOOD CULTURE   Received in Microbiology Lab  Culture in Progress  Received in Microbiology Lab  Culture in Progress         Consults and findings:   07/09/2020  Consult EPS:  ECG:  Intermittent CHB / Paroxysmal AV block    Patient has a history of being outside -camping, possible exposure to ticks  He also gives a history of fever about 2-3 days after his last camping trip  Blood test has been sent for Lyme disease    Test Results after admission  Pertinent Lab tests (dates/results):  Lyme Western Blot, Serum   Order: [id number] - Reflex for Order [id number]   Status:  Final result   Visible to patient:  Yes (MyChart) Next appt:  None   Component  7/9/20 1703   Lyme 18 kD IgG  PresentAbnormal      Lyme 23 kD IgG  PresentAbnormal      Lyme 28 kD IgG  Absent    Lyme 30 kD IgG  Absent    Lyme 39 kD IgG  PresentAbnormal      Lyme 41 kD IgG  PresentAbnormal      Lyme 45 kD IgG  Absent    Lyme 58 kD IgG  PresentAbnormal      Lyme 66 kD IgG  Absent    Lyme 93 kD IgG  Absent    Lyme 23 kD IgM  PresentAbnormal Lyme 39 kD IgM  PresentAbnormal      Lyme 41 kD IgM  PresentAbnormal      Lyme IgG WB Interp  PositiveAbnormal      Comment:                      Positive: 5 of the following                                  Borrelia-specific bands:                                  18,23,28,30,39,41,45,58,                                  66, and 80                         Negative: No bands or banding                                  patterns which do not                                  meet positive criteria  Lyme IgM WB Interp  PositiveAbnormal               Contains abnormal data  Lyme Antibody Profile with reflex to WB   Order: [id number]   Status:  Final result   Visible to patient:  Yes (MyChart) Next appt:  None    Ref Range & Units  7/9/20 1703   Lyme IgG/IgM Ab  0 00 - 0 90 ISR  3  45High      Comment:                                 Negative         <0 91                                   Equivocal  0 91 - 1 09                                   Positive         >1 09                  Culture results (blood, urine, spinal, wound, respiratory, etc ):  Imaging tests (dates/results):  EKG (dates/results): Other test (dates/results):  Tests pending (dates/results):    Surgical or Invasive Procedures  Name of surgery/procedure: temporary permanent pacemaker placement   Date & Time: 7/9/2020  Patient Response: tolerated   Post-operative orders: same   Operative Report/Findings: Will be in body for >48 hrs  VVI pacing  Pacer rate- 40/m  Threshold -1 mV   Pacing voltage 5 mv  Anesthesia-  local lidocaine       Response to Treatment, Major Change in Condition, Major Charge in Treatment anytime during admission  The nursing supervisor - Jaci Schwab, has been informed and has spoken with the patient - he has spoken with the patient , advised the patient can leave AMA after the temporary pacemaker is removed    The patient cannot be treated  against his wishes      Reached out to Dr Ike Muir , director and chief of cardiac electrophysiology services  He reached out to hospital   Dr Liyah Lopez and myself confirmed that patient has full mental capacity  Patient was fully updated about risks of sudden death  Patient determined to leave AMA    We have to safely remove the temporary pacemaker and allow him to leave    Patient was seen and evaluated     I explained in detail to the patient:  A)  he has underlying paroxysmal AV block and intermittent complete heart block - this can result in syncope/asystole and sudden cardiac death  B) we cannot leave the temporary pacing wire in him if he leaves AMA - this can lead to infection, sepsis, infective endocarditis - microorganisms tracking along the lead which is exteriorized - this can once again lead to fatal complication     Patient expressed understanding of the same and still wants to leave   When asked reason for leaving AMA - patient used expletives, profanities and racial slurs  I did ask him to sign a document which confirms that he understands his risk of major complication,  and still has decided to leave of his own accord  Patient did add profanities and then signed the same         Local area over the right internal jugular was sterilized with alcohol, sutures removed, lead unscrewed from the device, lead unscrewed from the heart muscle, removed carefully with manual traction,  pressure maintained over the site to obtain hemostasis     Patient was given prescription for doxycycline, an extra dose given before he left, advised to follow-up with primary care physician  He was apprised that we are unsure if this is lyme's disease  as blood test has not come back  If he starts feeling sick, he needs to get back to the hospital     Disposition on Discharge  Home, Rehab, SNF, LTC, Shelter, etc : Left against medical advice or discontinued care- after removal of temporary pacer    Cease to Breathe (CTB)  If a patient expires during an admission, in addition to the above information, please include:    Summary/timeline of the patient's decline in condition:    Medications and treatment:    Patient response to treatment:    Date and time patient ceased to breathe:        Is there a Readmission that follows this admission? Yes x No    If yes, provide dates:          InterQual Review    InterQual Criteria Met: x Yes  No  N/A        Please include the InterQual Review, InterQual year/version used, and the criteria selected:   Created Using  Review Status  Review Entered    [employer]®   In Primary  7/10/2020 11:45         Criteria Set Name - Subset    LOC:Acute Adult-Arrhythmia        Criteria Review    REVIEW SUMMARY     Patient: Tomasa Alvarez  Review Number: [id number]  Review Status: In Primary  Criteria Status: Intermediate Met  Day Met: Episode Day 1     Condition Specific: Yes        OUTCOMES  Outcome Type: Primary           REVIEW DETAILS     Product: [place] [facility] Adult  Subset: Arrhythmia      (Symptom or finding within 24h)         (Excludes PO medications unless noted)          [X] Select Day, One:              [X] Episode Day 1, One:                  [X] INTERMEDIATE, One:                      [X] Bradycardia, junctional rhythm, or atrioventricular block, Both:                          [X] Finding, >= One:                              [X] 2nd degree heart block (Mobitz II)                          [X] Continuous cardiac monitoring (excludes Holter) and, >= One:                              [X] Permanent pacemaker placement <= 24h     Version: [employer] 2019 1  [employer] Barn[employer]  © 2019 [employer] and/or one of its subsidiaries  All Rights Reserved  CPT only © 2018 [employer]  All Rights Reserved               PLEASE SUBMIT THE COMPLETED FORM TO THE DEPARTMENT OF HUMAN SERVICES - DIVISION OF CLINICAL  REVIEW VIA FAX [phone] or VIA E-MAIL TO [email or web address]    Signature: Pablo Allison Date: 08/21/20    Confidentiality Notice: The documents accompanying this telecopy may contain confidential information belonging to the sender  The information is intended only for the use of the individual named above  If you are not the intended recipient, you are hereby notified  That any disclosure, copying, distribution or taking of any telecopy is strictly prohibited

## 2021-09-30 PROCEDURE — 99283 EMERGENCY DEPT VISIT LOW MDM: CPT

## 2021-10-01 ENCOUNTER — HOSPITAL ENCOUNTER (EMERGENCY)
Facility: HOSPITAL | Age: 26
Discharge: HOME | End: 2021-10-01
Attending: EMERGENCY MEDICINE | Admitting: EMERGENCY MEDICINE
Payer: COMMERCIAL

## 2021-10-01 VITALS
OXYGEN SATURATION: 98 % | SYSTOLIC BLOOD PRESSURE: 121 MMHG | RESPIRATION RATE: 16 BRPM | HEART RATE: 73 BPM | DIASTOLIC BLOOD PRESSURE: 54 MMHG

## 2021-10-01 DIAGNOSIS — S81.012A KNEE LACERATION, LEFT, INITIAL ENCOUNTER: Primary | ICD-10-CM

## 2021-10-01 DIAGNOSIS — T07.XXXA ABRASIONS OF MULTIPLE SITES: ICD-10-CM

## 2021-10-01 PROCEDURE — 99284 EMERGENCY DEPT VISIT MOD MDM: CPT | Performed by: EMERGENCY MEDICINE

## 2021-10-01 RX ORDER — GINSENG 100 MG
1 CAPSULE ORAL ONCE
Status: COMPLETED | OUTPATIENT
Start: 2021-10-01 | End: 2021-10-01

## 2021-10-01 RX ORDER — ACETAMINOPHEN 325 MG/1
650 TABLET ORAL ONCE
Status: COMPLETED | OUTPATIENT
Start: 2021-10-01 | End: 2021-10-01

## 2021-10-01 RX ORDER — CEPHALEXIN 500 MG/1
500 CAPSULE ORAL ONCE
Status: COMPLETED | OUTPATIENT
Start: 2021-10-01 | End: 2021-10-01

## 2021-10-01 RX ORDER — CEPHALEXIN 500 MG/1
500 CAPSULE ORAL 4 TIMES DAILY
Qty: 20 CAPSULE | Refills: 0 | Status: SHIPPED | OUTPATIENT
Start: 2021-10-01 | End: 2021-10-06

## 2021-10-01 RX ADMIN — BACITRACIN 1 SMALL APPLICATION: 500 OINTMENT TOPICAL at 01:00

## 2021-10-01 RX ADMIN — ACETAMINOPHEN 650 MG: 325 TABLET, FILM COATED ORAL at 01:00

## 2021-10-01 RX ADMIN — CEPHALEXIN 500 MG: 500 CAPSULE ORAL at 01:00
